# Patient Record
Sex: FEMALE | Race: WHITE | NOT HISPANIC OR LATINO | Employment: FULL TIME | URBAN - METROPOLITAN AREA
[De-identification: names, ages, dates, MRNs, and addresses within clinical notes are randomized per-mention and may not be internally consistent; named-entity substitution may affect disease eponyms.]

---

## 2019-03-28 ENCOUNTER — TRANSCRIBE ORDERS (OUTPATIENT)
Dept: ADMINISTRATIVE | Facility: HOSPITAL | Age: 39
End: 2019-03-28

## 2019-03-28 DIAGNOSIS — G93.2 BENIGN INTRACRANIAL HYPERTENSION: Primary | ICD-10-CM

## 2019-04-03 ENCOUNTER — HOSPITAL ENCOUNTER (OUTPATIENT)
Dept: MRI IMAGING | Facility: HOSPITAL | Age: 39
Discharge: HOME | End: 2019-04-03
Admitting: FAMILY MEDICINE

## 2019-04-03 DIAGNOSIS — G93.2 BENIGN INTRACRANIAL HYPERTENSION: ICD-10-CM

## 2019-04-03 PROCEDURE — 0 GADOBENATE DIMEGLUMINE 529 MG/ML SOLUTION: Performed by: FAMILY MEDICINE

## 2019-04-03 PROCEDURE — 82565 ASSAY OF CREATININE: CPT

## 2019-04-03 PROCEDURE — A9577 INJ MULTIHANCE: HCPCS | Performed by: FAMILY MEDICINE

## 2019-04-03 PROCEDURE — 70553 MRI BRAIN STEM W/O & W/DYE: CPT

## 2019-04-03 RX ADMIN — GADOBENATE DIMEGLUMINE 19 ML: 529 INJECTION, SOLUTION INTRAVENOUS at 14:37

## 2019-04-04 ENCOUNTER — APPOINTMENT (OUTPATIENT)
Dept: MRI IMAGING | Facility: HOSPITAL | Age: 39
End: 2019-04-04

## 2019-04-10 LAB — CREAT BLDA-MCNC: 0.7 MG/DL (ref 0.6–1.3)

## 2019-05-16 ENCOUNTER — OFFICE VISIT (OUTPATIENT)
Dept: NEUROSURGERY | Facility: CLINIC | Age: 39
End: 2019-05-16

## 2019-05-16 VITALS
BODY MASS INDEX: 36.32 KG/M2 | HEART RATE: 88 BPM | SYSTOLIC BLOOD PRESSURE: 146 MMHG | WEIGHT: 205 LBS | HEIGHT: 63 IN | RESPIRATION RATE: 20 BRPM | DIASTOLIC BLOOD PRESSURE: 112 MMHG

## 2019-05-16 DIAGNOSIS — G93.2 PSEUDOTUMOR CEREBRI: Primary | ICD-10-CM

## 2019-05-16 DIAGNOSIS — H53.8 BLURRED VISION: ICD-10-CM

## 2019-05-16 PROCEDURE — 99214 OFFICE O/P EST MOD 30 MIN: CPT | Performed by: NEUROLOGICAL SURGERY

## 2019-05-16 NOTE — PROGRESS NOTES
Subjective   Patient ID: Marie Bernabe is a 39 y.o. female is here today for follow-up of HA, Blurred vison/Psuedotumor.  Pt is unaccompanied.    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    She had essentially resolution of her symptomatology and reduction and intracranial pressure spontaneously.    The last 3 to 4 months she has noted increasing problems with vision in the right eye.  Additionally she has had a sense of pressure on the right side of her head.  On this basis she was evaluated in a optometry office in [place] and the suggestion of multiple sclerosis was made an MRI scan was ordered.    She is return to the office for further perhaps more definitive treatment.    The following portions of the patient's history were reviewed and updated as appropriate: allergies, current medications, past family history, past medical history, past social history, past surgical history and problem list.    Review of Systems   Eyes: Positive for visual disturbance (blurred vision).   Genitourinary: Negative for difficulty urinating and enuresis.   Neurological: Positive for dizziness (occasionally) and headaches. Negative for tremors, syncope, facial asymmetry, speech difficulty, weakness and numbness.   Psychiatric/Behavioral: Negative for confusion and decreased concentration.     Past Medical History:   Diagnosis Date   • Anxiety    • Blurred vision    • Depression    • Pseudotumor cerebri    • Spinal headache     AFTER SPINAL TAP.       Past Surgical History:   Procedure Laterality Date   • EMBOLIZATION CEREBRAL Left 3/14/2016    Procedure: CEREBRAL ANGIOGRAM W/ LT LUMBAR PUNCTURE;  Surgeon: Maico Groves MD;  Location: [facility] 18/19;  Service:    • EMBOLIZATION CEREBRAL Right 5/25/2016    Procedure:  CEREBRAL ANGIOGRAM AND VENOGRAM ;  Surgeon: Maico Groves MD;  Location: [facility] OR 18/19;  Service:    • LUMBAR PUNCTURE  03/14/2016    Dr. 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   Tobacco Use   • Smoking status: Never Smoker   • Smokeless tobacco: Never Used   Substance and Sexual Activity   • Alcohol use: No   • Drug use: No   • Sexual activity: Defer       Family History   Problem Relation Age of Onset   • Cancer Maternal Grandmother         breast/passed   • Heart disease Maternal Grandfather    • Heart disease Paternal Grandfather         No Known Allergies      Current Outpatient Medications:   •  naproxen sodium (ALEVE) 220 MG tablet, Take 220 mg by mouth as needed., Disp: , Rfl:     Objective   Physical Exam   Constitutional: She is oriented to person, place, and time.   Eyes: EOM are normal.   Fundoscopic exam:       The right eye shows papilledema.        The left eye shows papilledema.   There is evidence of prior swelling of the optic nerves.  I do not see any hemorrhages or exudates.   Neck: Carotid bruit is not present.   Cardiovascular: Normal rate, regular rhythm and normal heart sounds.   Neurological: She is oriented to person, place, and time. She has a normal Finger-Nose-Finger Test, a normal Heel to Shin Test, a normal Romberg Test and a normal Tandem Gait Test. Gait normal.   Reflex Scores:       Tricep reflexes are 2+ on the right side and 2+ on the left side.       Bicep reflexes are 2+ on the right side and 2+ on the left side.       Brachioradialis reflexes are 2+ on the right side and 2+ on the left side.       Patellar reflexes are 2+ on the right side and 2+ on the left  side.       Achilles reflexes are 2+ on the right side and 2+ on the left side.  Psychiatric: Her speech is normal.     Neurologic Exam     Mental Status   Oriented to person, place, and time.   Registration: recalls 3 of 3 objects. Recall at 5 minutes: recalls 3 of 3 objects. Follows 3 step commands.   Attention: normal. Concentration: normal.   Speech: speech is normal   Level of consciousness: alert  Knowledge: good.   Able to name object. Able to read. Able to repeat. Able to write. Normal comprehension.     Cranial Nerves     CN II   Visual fields full to confrontation.     CN III, IV, VI   Extraocular motions are normal.   Right pupil: Consensual response: intact.   Left pupil: Consensual response: intact.   CN III: no CN III palsy  CN VI: no CN VI palsy  Nystagmus: none   Diplopia: none  Ophthalmoparesis: none  Upgaze: normal  Downgaze: normal  Conjugate gaze: present  Vestibulo-ocular reflex: present    CN V   Facial sensation intact.     CN VII   Facial expression full, symmetric.     CN VIII   CN VIII normal.     CN IX, X   CN IX normal.     CN XI   CN XI normal.     CN XII   CN XII normal.     Motor Exam   Muscle bulk: normal  Overall muscle tone: normal  Right arm tone: normal  Left arm tone: normal  Right arm pronator drift: absent  Left arm pronator drift: absent  Right leg tone: normal  Left leg tone: normal    Strength   Right neck flexion: 5/5  Left neck flexion: 5/5  Right neck extension: 5/5  Left neck extension: 5/5  Right deltoid: 5/5  Left deltoid: 5/5  Right biceps: 5/5  Left biceps: 5/5  Right triceps: 5/5  Left triceps: 5/5  Right wrist flexion: 5/5  Left wrist flexion: 5/5  Right wrist extension: 5/5  Left wrist extension: 5/5  Right interossei: 5/5  Left interossei: 5/5  Right abdominals: 5/5  Left abdominals: 5/5  Right iliopsoas: 5/5  Left iliopsoas: 5/5  Right quadriceps: 5/5  Left quadriceps: 5/5  Right hamstrin/5  Left hamstrin/5  Right glutei: 5/5  Left glutei: 5/5  Right  anterior tibial: 5/5  Left anterior tibial: 5/5  Right posterior tibial: 5/5  Left posterior tibial: 5/5  Right peroneal: 5/5  Left peroneal: 5/5  Right gastroc: 5/5  Left gastroc: 5/5    Sensory Exam   Light touch normal.   Vibration normal.   Proprioception normal.   Pinprick normal.     Gait, Coordination, and Reflexes     Gait  Gait: normal    Coordination   Romberg: negative  Finger to nose coordination: normal  Heel to shin coordination: normal  Tandem walking coordination: normal    Tremor   Resting tremor: absent  Intention tremor: absent  Action tremor: absent    Reflexes   Right brachioradialis: 2+  Left brachioradialis: 2+  Right biceps: 2+  Left biceps: 2+  Right triceps: 2+  Left triceps: 2+  Right patellar: 2+  Left patellar: 2+  Right achilles: 2+  Left achilles: 2+  Right : 2+  Left : 2+  Right plantar: normal  Left plantar: normal  Right Frias: absent  Left Frias: absent  Right ankle clonus: absent  Left ankle clonus: absent      Assessment/Plan   Independent Review of Radiographic Studies:    I personally reviewed the MRI scan of brain which appears to be normal.  There is evidence of an empty sella.  Medical Decision Making:    This very pleasant patient returns to the office today with recurrent symptomatology.    I am going to get her to see an ophthalmologist for better evaluation of her life situation.    I also explained to the patient that we have newer stents available and that if she continues to have symptomatology it is reasonable to consider trying again to reopen the narrowed area in the drainage  system of the blood out of her brain.    I am going to recommend that she undergo lumbar puncture again in order to measure pressure to see whether or not the pressure remains elevated.  This was the first step in determining whether any other invasive testing is necessary in order to try to treat this.      Marie was seen today for ha, blurred vison/psuedotumor.    Diagnoses  and all orders for this visit:    Pseudotumor cerebri  -     Ambulatory Referral to [facility] for Minor Surgical Procedure  -     Ambulatory Referral to Ophthalmology    Blurred vision  -     Ambulatory Referral to Ophthalmology    Other orders  -     Obtain Informed Consent; Standing  -     Notify Provider if Patient Taking Blood Thinning Agents; Standing  -     Glucose, CSF - Cerebrospinal Fluid, Lumbar Puncture; Standing  -     Protein, CSF - Cerebrospinal Fluid, Lumbar Puncture; Standing  -     Culture, CSF - Cerebrospinal Fluid, Lumbar Puncture; Standing  -     Gram Stain; Standing  -     Cell Count With Differential, CSF; Standing  -     Cell Count With Differential, CSF; Standing  -     Head of Bed Flat; Standing      Return for Follow up after testing.

## 2019-05-30 ENCOUNTER — HOSPITAL ENCOUNTER (OUTPATIENT)
Dept: INFUSION THERAPY | Facility: HOSPITAL | Age: 39
Discharge: HOME | End: 2019-05-30
Admitting: NEUROLOGICAL SURGERY

## 2019-05-30 VITALS
DIASTOLIC BLOOD PRESSURE: 84 MMHG | HEART RATE: 80 BPM | SYSTOLIC BLOOD PRESSURE: 142 MMHG | TEMPERATURE: 98 F | RESPIRATION RATE: 16 BRPM | OXYGEN SATURATION: 96 %

## 2019-05-30 DIAGNOSIS — G93.2 PSEUDOTUMOR CEREBRI: Primary | ICD-10-CM

## 2019-05-30 LAB
APPEARANCE CSF: CLEAR
COLOR CSF: COLORLESS
GLUCOSE CSF-MCNC: 59 MG/DL (ref 40–70)
METHOD: ABNORMAL
NUC CELL # CSF MANUAL: 1 /MM3 (ref 0–5)
PROT CSF-MCNC: 30 MG/DL (ref 15–45)
RBC # CSF MANUAL: 3 /MM3 (ref 0–0)
TUBE # CSF: 4

## 2019-05-30 PROCEDURE — 87015 SPECIMEN INFECT AGNT CONCNTJ: CPT | Performed by: NEUROLOGICAL SURGERY

## 2019-05-30 PROCEDURE — 87070 CULTURE OTHR SPECIMN AEROBIC: CPT | Performed by: NEUROLOGICAL SURGERY

## 2019-05-30 PROCEDURE — 84157 ASSAY OF PROTEIN OTHER: CPT | Performed by: NEUROLOGICAL SURGERY

## 2019-05-30 PROCEDURE — 62272 THER SPI PNXR DRG CSF: CPT | Performed by: NEUROLOGICAL SURGERY

## 2019-05-30 PROCEDURE — 87205 SMEAR GRAM STAIN: CPT | Performed by: NEUROLOGICAL SURGERY

## 2019-05-30 PROCEDURE — 82945 GLUCOSE OTHER FLUID: CPT | Performed by: NEUROLOGICAL SURGERY

## 2019-05-30 PROCEDURE — 89050 BODY FLUID CELL COUNT: CPT | Performed by: NEUROLOGICAL SURGERY

## 2019-05-30 RX ORDER — LIDOCAINE HYDROCHLORIDE 10 MG/ML
20 INJECTION, SOLUTION INFILTRATION; PERINEURAL ONCE
Status: COMPLETED | OUTPATIENT
Start: 2019-05-30 | End: 2019-05-30

## 2019-05-30 RX ADMIN — LIDOCAINE HYDROCHLORIDE 20 ML: 10 INJECTION, SOLUTION INFILTRATION; PERINEURAL at 11:34

## 2019-06-02 LAB
BACTERIA SPEC AEROBE CULT: NORMAL
GRAM STN SPEC: NORMAL
GRAM STN SPEC: NORMAL

## 2019-06-11 ENCOUNTER — OFFICE VISIT (OUTPATIENT)
Dept: NEUROSURGERY | Facility: CLINIC | Age: 39
End: 2019-06-11

## 2019-06-11 VITALS
HEART RATE: 119 BPM | RESPIRATION RATE: 18 BRPM | BODY MASS INDEX: 36.14 KG/M2 | WEIGHT: 204 LBS | SYSTOLIC BLOOD PRESSURE: 122 MMHG | HEIGHT: 63 IN | DIASTOLIC BLOOD PRESSURE: 88 MMHG

## 2019-06-11 DIAGNOSIS — G08 TRANSVERSE SINUS THROMBOSIS: Primary | ICD-10-CM

## 2019-06-11 DIAGNOSIS — G93.2 PSEUDOTUMOR CEREBRI: ICD-10-CM

## 2019-06-11 PROCEDURE — 99214 OFFICE O/P EST MOD 30 MIN: CPT | Performed by: NEUROLOGICAL SURGERY

## 2019-06-11 RX ORDER — ASPIRIN 81 MG/1
81 TABLET, CHEWABLE ORAL ONCE
Status: DISCONTINUED | OUTPATIENT
Start: 2019-06-11 | End: 2019-07-03

## 2019-06-11 RX ORDER — CLOPIDOGREL BISULFATE 75 MG/1
75 TABLET ORAL DAILY
Qty: 30 TABLET | Refills: 6 | Status: ON HOLD | OUTPATIENT
Start: 2019-06-11 | End: 2019-07-11 | Stop reason: SDUPTHER

## 2019-06-11 RX ORDER — HYDROCHLOROTHIAZIDE 25 MG/1
25 TABLET ORAL DAILY
Refills: 5 | COMMUNITY
Start: 2019-06-04

## 2019-06-11 RX ORDER — SODIUM CHLORIDE 9 MG/ML
100 INJECTION, SOLUTION INTRAVENOUS CONTINUOUS
Status: CANCELLED | OUTPATIENT
Start: 2019-07-10

## 2019-06-11 NOTE — PROGRESS NOTES
"Subjective   Patient ID: Marie Bernabe is a 39 y.o. female is here today for follow-up pseudotumor cerebri. Patient presents unaccompanied.     History of Present Illness    She returns the office today for ongoing follow-up with history of pseudotumor cerebri status post lumbar puncture on May 30 with Dr. Groves.  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  She had prior history of blurred vision with reduction of intracranial pressure that resolved spontaneously a few years ago.    Over the last few months, she began developing increasing problems with vision in the right eye with a sense of pressure in the right side of her head.  Today, she reports that the symptoms continue.  She feels that there is a \"black area\" on the right side of her right eye visual field.  She denies any double or blurred vision.  She states that her vision is clear enough with the right eye even with the left eye closed to drive without issue.  She continues to have pressure headaches that present in the back of her neck on the right side and behind her ear.  She thinks a lot of it has to do with allergy and sinus problems.    Opening LP pressure 48.    She presents unaccompanied.      /88 (BP Location: Right arm, Patient Position: Sitting)   Pulse 119   Resp 18   Ht 160 cm (63\")   Wt 92.5 kg (204 lb)   BMI 36.14 kg/m²     The following portions of the patient's history were reviewed and updated as appropriate: allergies, current medications, past family history, past medical history, past social history, past surgical history and problem list.    Review of Systems   HENT: Negative for trouble swallowing.    Eyes: Positive for visual disturbance (occ'l blurry).   Musculoskeletal: Negative for gait problem.   Neurological: Positive for headaches (pressure). Negative for dizziness, tremors, seizures, syncope, speech difficulty, " weakness, light-headedness and numbness.   Psychiatric/Behavioral: Negative for confusion and decreased concentration.       Objective   Physical Exam   Constitutional: She is oriented to person, place, and time. She appears well-developed and well-nourished. She is cooperative.  Non-toxic appearance. She does not have a sickly appearance. She does not appear ill.   Pleasant well-appearing obese younger female   HENT:   Head: Normocephalic and atraumatic.   Nontender firm palpation bilateral frontal sinuses.  Tender to minimal palpation bilateral maxillary sinuses.   Eyes: EOM are normal. Pupils are equal, round, and reactive to light.   Neck: Neck supple. No tracheal deviation present.   Pulmonary/Chest: Effort normal.   Musculoskeletal: Normal range of motion. She exhibits no tenderness or deformity.   Moving all extremities well, strength equal and intact throughout   Neurological: She is alert and oriented to person, place, and time. She has normal strength. She displays no tremor. No cranial nerve deficit. Coordination and gait normal. GCS eye subscore is 4. GCS verbal subscore is 5. GCS motor subscore is 6.   Gait is stable and upright  Extraocular movements intact  Normal facial symmetry  Visual fields full and intact bilaterally   Skin: Skin is warm and dry.   Psychiatric: She has a normal mood and affect. Her behavior is normal. Thought content normal.   Vitals reviewed.    Neurologic Exam     Mental Status   Oriented to person, place, and time.     Cranial Nerves     CN III, IV, VI   Pupils are equal, round, and reactive to light.  Extraocular motions are normal.     Motor Exam     Strength   Strength 5/5 throughout.       Assessment/Plan   Independent Review of Radiographic Studies:    CSF studies dated May 30, 2019 reveal clear colorless fluid.  4th tube had only 3 RBCs.  Total nucleated cells 1.  Glucose 59.  Protein 30.  Opening pressure= 48 cm of water    Medical Decision Making:    I confirmed and  obtained the above history as recorded by the nurse practitioner acting as a scribe. I performed the above examination and it is documented by the nurse practitioner acting as a scribe.    The patient presents at this time with a history as noted above.  She did have a few days of improved headache and then it came back-with a vengeance.    She does have very elevated intracranial pressure.  She does also have a transverse sinus narrowed area that, we demonstrated caused increased pressure intracranially by measuring proximal and distal to the area of stenosis of the intracranial pressure.    Treatment options include an attempt to reopen the transverse sinus with a stent-again.  Previously I was unable to pass a relatively inflexible stent into this area.  I believe that the more flexible stents are now available and we can probably get a stent into their to try to alleviate this particular problem.  Other options include placement of a lumboperitoneal shunt to reduce the pressure.  I did explain to the patient unfortunately this does not attack the primary cause of the problem and shunts like this are prone to failure.    I am to recommend that we try, again, to reopen the stenotic segment of the transverse sinus.    I explained the risk benefits and alternatives of transverse sinus venography and stenting.  Risks include but are not limited to the possibility of death, infection, bleeding, damage to the vein, damage to arteries leading to the brain, lack of ability to perform the procedure, thrombosis of the stent causing increased problems etc.  The alternative is a lumboperitoneal shunt and/or abstinence of treatment.  Patient voiced understanding and request that we proceed.    After a complete physical exam, the patient has been informed of the consequences, benefits, appropriate us, and office policies regarding the medication being prescribed. A REY check will be made on-line, and will be repeated if  prescription is renewed after a 90 day period. The patient agrees to adhering to the medication regimen as prescribed.    The patient has been advised that we will manage post-operative pain for 1 month. If further narcotic medication is needed beyond that period, a referral back to the primary care physician or to a pain management specialist will be made. If the patient cancels or fails to show for scheduled follow-up visits or the pain management referral,  further narcotic prescriptions from this practice may cease.    Plan: Cerebral arteriography and venography with placement of a transverse sinus stent and angioplasty.    Marie was seen today for pseudotumor cerebri.    Diagnoses and all orders for this visit:    Left transverse sinus hypoplasia and right transverse sinus stenosis  -     Case Request; Standing  -     CBC and Differential; Future  -     Basic metabolic panel; Future  -     sodium chloride 0.9 % infusion  -     P2Y12 Platelet Inhibition; Future  -     Platelet Response Aspirin; Future  -     Case Request  -     aspirin chewable tablet 81 mg    Pseudotumor cerebri  -     Case Request; Standing  -     CBC and Differential; Future  -     Basic metabolic panel; Future  -     sodium chloride 0.9 % infusion  -     P2Y12 Platelet Inhibition; Future  -     Platelet Response Aspirin; Future  -     Case Request  -     aspirin chewable tablet 81 mg    Other orders  -     Follow anesthesia standing orders.  -     Obtain informed consent  -     Provide NPO Instructions to Patient; Future  -     Clorhexidine skin prep; Future  -     Follow Anesthesia Guidelines / Standing Orders; Standing  -     Obtain informed consent; Standing  -     SCD (sequential compression device)- to be placed on patient in Pre-op; Standing  -     Clip operative site; Standing  -     Inpatient Admission; Standing  -     clopidogrel (PLAVIX) 75 MG tablet; Take 1 tablet by mouth Daily.      Return for Recheck 2 weeks after surgery,  Follow up with nurse practitioner.

## 2019-06-16 ENCOUNTER — RESULTS ENCOUNTER (OUTPATIENT)
Dept: NEUROSURGERY | Facility: CLINIC | Age: 39
End: 2019-06-16

## 2019-06-16 DIAGNOSIS — G93.2 PSEUDOTUMOR CEREBRI: ICD-10-CM

## 2019-06-16 DIAGNOSIS — G08 TRANSVERSE SINUS THROMBOSIS: ICD-10-CM

## 2019-06-25 ENCOUNTER — TELEPHONE (OUTPATIENT)
Dept: NEUROSURGERY | Facility: CLINIC | Age: 39
End: 2019-06-25

## 2019-07-03 ENCOUNTER — APPOINTMENT (OUTPATIENT)
Dept: PREADMISSION TESTING | Facility: HOSPITAL | Age: 39
End: 2019-07-03

## 2019-07-03 VITALS
BODY MASS INDEX: 35.97 KG/M2 | SYSTOLIC BLOOD PRESSURE: 139 MMHG | RESPIRATION RATE: 20 BRPM | WEIGHT: 203 LBS | OXYGEN SATURATION: 97 % | TEMPERATURE: 98 F | DIASTOLIC BLOOD PRESSURE: 92 MMHG | HEIGHT: 63 IN | HEART RATE: 108 BPM

## 2019-07-03 DIAGNOSIS — G93.2 PSEUDOTUMOR CEREBRI: ICD-10-CM

## 2019-07-03 DIAGNOSIS — G08 TRANSVERSE SINUS THROMBOSIS: ICD-10-CM

## 2019-07-03 LAB
ANION GAP SERPL CALCULATED.3IONS-SCNC: 11.8 MMOL/L (ref 5–15)
ASA PLATELET INHIBITION: 538 ARU
BASOPHILS # BLD AUTO: 0.01 10*3/MM3 (ref 0–0.2)
BASOPHILS NFR BLD AUTO: 0.2 % (ref 0–1.5)
BUN BLD-MCNC: 9 MG/DL (ref 6–20)
BUN/CREAT SERPL: 10.8 (ref 7–25)
CALCIUM SPEC-SCNC: 9.5 MG/DL (ref 8.6–10.5)
CHLORIDE SERPL-SCNC: 95 MMOL/L (ref 98–107)
CO2 SERPL-SCNC: 31.2 MMOL/L (ref 22–29)
CREAT BLD-MCNC: 0.83 MG/DL (ref 0.57–1)
DEPRECATED RDW RBC AUTO: 38.1 FL (ref 37–54)
EOSINOPHIL # BLD AUTO: 0.04 10*3/MM3 (ref 0–0.4)
EOSINOPHIL NFR BLD AUTO: 1 % (ref 0.3–6.2)
ERYTHROCYTE [DISTWIDTH] IN BLOOD BY AUTOMATED COUNT: 11.9 % (ref 12.3–15.4)
GFR SERPL CREATININE-BSD FRML MDRD: 77 ML/MIN/1.73
GLUCOSE BLD-MCNC: 186 MG/DL (ref 65–99)
HCT VFR BLD AUTO: 39.3 % (ref 34–46.6)
HGB BLD-MCNC: 13 G/DL (ref 12–15.9)
IMM GRANULOCYTES # BLD AUTO: 0.01 10*3/MM3 (ref 0–0.05)
IMM GRANULOCYTES NFR BLD AUTO: 0.2 % (ref 0–0.5)
LYMPHOCYTES # BLD AUTO: 1.2 10*3/MM3 (ref 0.7–3.1)
LYMPHOCYTES NFR BLD AUTO: 29.1 % (ref 19.6–45.3)
MCH RBC QN AUTO: 29.5 PG (ref 26.6–33)
MCHC RBC AUTO-ENTMCNC: 33.1 G/DL (ref 31.5–35.7)
MCV RBC AUTO: 89.1 FL (ref 79–97)
MONOCYTES # BLD AUTO: 0.3 10*3/MM3 (ref 0.1–0.9)
MONOCYTES NFR BLD AUTO: 7.3 % (ref 5–12)
NEUTROPHILS # BLD AUTO: 2.57 10*3/MM3 (ref 1.7–7)
NEUTROPHILS NFR BLD AUTO: 62.2 % (ref 42.7–76)
NRBC BLD AUTO-RTO: 0 /100 WBC (ref 0–0.2)
PA ADP PRP-ACNC: 201 PRU (ref 194–418)
PLATELET # BLD AUTO: 218 10*3/MM3 (ref 140–450)
PMV BLD AUTO: 10.5 FL (ref 6–12)
POTASSIUM BLD-SCNC: 3.1 MMOL/L (ref 3.5–5.2)
RBC # BLD AUTO: 4.41 10*6/MM3 (ref 3.77–5.28)
SODIUM BLD-SCNC: 138 MMOL/L (ref 136–145)
WBC NRBC COR # BLD: 4.13 10*3/MM3 (ref 3.4–10.8)

## 2019-07-03 PROCEDURE — 85025 COMPLETE CBC W/AUTO DIFF WBC: CPT | Performed by: NEUROLOGICAL SURGERY

## 2019-07-03 PROCEDURE — 85576 BLOOD PLATELET AGGREGATION: CPT | Performed by: NEUROLOGICAL SURGERY

## 2019-07-03 PROCEDURE — 93010 ELECTROCARDIOGRAM REPORT: CPT | Performed by: INTERNAL MEDICINE

## 2019-07-03 PROCEDURE — 80048 BASIC METABOLIC PNL TOTAL CA: CPT | Performed by: NEUROLOGICAL SURGERY

## 2019-07-03 PROCEDURE — 93005 ELECTROCARDIOGRAM TRACING: CPT

## 2019-07-03 PROCEDURE — 36415 COLL VENOUS BLD VENIPUNCTURE: CPT

## 2019-07-03 RX ORDER — ASPIRIN 81 MG/1
81 TABLET ORAL DAILY
COMMUNITY

## 2019-07-03 NOTE — DISCHARGE INSTRUCTIONS
2% CHLORAHEXIDINE GLUCONATE* CLOTH  Preparing or “prepping” skin before surgery can reduce the risk of infection at the surgical site. To make the process easier, [facility] has chosen disposable cloths moistened with a rinse-free, 2% Chlorhexidine Gluconate (CHG) antiseptic solution. The steps below outline the prepping process and should be carefully followed.        Use the prep cloth on the area that is circled in the diagram             Directions Night before Surgery  1) Shower using a fresh bar of anti-bacterial soap (such as Dial) and clean washcloth.  Use a clean towel to completely dry your skin.  2) Do not use any lotions, oils or creams on your skin.  3) Open the package and remove 1 cloth, wipe your skin for 30 seconds in a circular motion.  Allow to dry for 3 minutes.  4) Repeat #3 with second cloth.  5) Do not touch your eyes, ears, or mouth with the prep cloth.  6) Allow the wet prep solution to air dry.  7) Discard the prep cloth and wash your hands with soap and water.   8) Dress in clean bed clothes and sleep on fresh clean bed sheets.   9) You may experience some temporary itching after the prep.    Directions Day of Surgery  1) Repeat steps 1,2,3,4,5,6,7, and 9.   2) Dress in clean clothes before coming to the hospital.  Take the following medications the morning of surgery with a small sip of water:    Pt to call Dr Groves for specific meds to take day of surgery    General Instructions:  • Do not eat or drink anything after midnight the night before surgery.  • Infants may have breast milk up to four hours before surgery.  • Infants drinking formula may drink formula up to six hours before surgery.   • Patients who avoid smoking, chewing tobacco and alcohol for 4 weeks prior to surgery have a reduced risk of post-operative complications.  Quit smoking as many days before surgery as you can.  • Do not smoke, use chewing tobacco or drink alcohol the day of surgery.   • If  applicable bring your C-PAP/ BI-PAP machine.  • Bring any papers given to you in the doctor’s office.  • Wear clean comfortable clothes and socks.  • Do not wear contact lenses, false eyelashes or make-up.  Bring a case for your glasses.   • Bring crutches or walker if applicable.  • Remove all piercings.  Leave jewelry and any other valuables at home.  • Hair extensions with metal clips must be removed prior to surgery.  • The Pre-Admission Testing nurse will instruct you to bring medications if unable to obtain an accurate list in Pre-Admission Testing.        If you were given a blood bank ID arm band remember to bring it with you the day of surgery.    Preventing a Surgical Site Infection:  • For 2 to 3 days before surgery, avoid shaving with a razor because the razor can irritate skin and make it easier to develop an infection.    • Any areas of open skin can increase the risk of a post-operative wound infection by allowing bacteria to enter and travel throughout the body.  Notify your surgeon if you have any skin wounds / rashes even if it is not near the expected surgical site.  The area will need assessed to determine if surgery should be delayed until it is healed.  • The night prior to surgery sleep in a clean bed with clean clothing.  Do not allow pets to sleep with you.  • Shower on the morning of surgery using a fresh bar of anti-bacterial soap (such as Dial) and clean washcloth.  Dry with a clean towel and dress in clean clothing.  • Ask your surgeon if you will be receiving antibiotics prior to surgery.  • Make sure you, your family, and all healthcare providers clean their hands with soap and water or an alcohol based hand  before caring for you or your wound.    Day of surgery:7/10/19   0730  Upon arrival, a Pre-op nurse and Anesthesiologist will review your health history, obtain vital signs, and answer questions you may have.  The only belongings needed at this time will be your home  medications and if applicable your C-PAP/BI-PAP machine.  If you are staying overnight your family can leave the rest of your belongings in the car and bring them to your room later.  A Pre-op nurse will start an IV and you may receive medication in preparation for surgery, including something to help you relax.  Your family will be able to see you in the Pre-op area.  While you are in surgery your family should notify the waiting room  if they leave the waiting room area and provide a contact phone number.    Please be aware that surgery does come with discomfort.  We want to make every effort to control your discomfort so please discuss any uncontrolled symptoms with your nurse.   Your doctor will most likely have prescribed pain medications.      If you are going home after surgery you will receive individualized written care instructions before being discharged.  A responsible adult must drive you to and from the hospital on the day of your surgery and stay with you for 24 hours.    If you are staying overnight following surgery, you will be transported to your hospital room following the recovery period.  [facility] has all private rooms.    You have received a list of surgical assistants for your reference.  If you have any questions please call Pre-Admission Testing at [phone].  Deductibles and co-payments are collected on the day of service. Please be prepared to pay the required co-pay, deductible or deposit on the day of service as defined by your plan.

## 2019-07-05 DIAGNOSIS — G08 TRANSVERSE SINUS THROMBOSIS: Primary | ICD-10-CM

## 2019-07-08 ENCOUNTER — PREP FOR SURGERY (OUTPATIENT)
Dept: OTHER | Facility: HOSPITAL | Age: 39
End: 2019-07-08

## 2019-07-09 ENCOUNTER — ANESTHESIA EVENT (OUTPATIENT)
Dept: PERIOP | Facility: HOSPITAL | Age: 39
End: 2019-07-09

## 2019-07-10 ENCOUNTER — HOSPITAL ENCOUNTER (INPATIENT)
Facility: HOSPITAL | Age: 39
LOS: 1 days | Discharge: HOME | End: 2019-07-11
Attending: NEUROLOGICAL SURGERY | Admitting: NEUROLOGICAL SURGERY

## 2019-07-10 ENCOUNTER — APPOINTMENT (OUTPATIENT)
Dept: GENERAL RADIOLOGY | Facility: HOSPITAL | Age: 39
End: 2019-07-10

## 2019-07-10 ENCOUNTER — ANESTHESIA (OUTPATIENT)
Dept: PERIOP | Facility: HOSPITAL | Age: 39
End: 2019-07-10

## 2019-07-10 DIAGNOSIS — G93.2 PSEUDOTUMOR CEREBRI: ICD-10-CM

## 2019-07-10 DIAGNOSIS — G08 TRANSVERSE SINUS THROMBOSIS: ICD-10-CM

## 2019-07-10 LAB
B-HCG UR QL: NEGATIVE
GLUCOSE BLDC GLUCOMTR-MCNC: 121 MG/DL (ref 70–130)
GLUCOSE BLDC GLUCOMTR-MCNC: 143 MG/DL (ref 70–130)
INTERNAL NEGATIVE CONTROL: NEGATIVE
INTERNAL POSITIVE CONTROL: POSITIVE
Lab: NORMAL
PA ADP PRP-ACNC: 170 PRU (ref 194–418)
PA ADP PRP-ACNC: 208 PRU (ref 194–418)

## 2019-07-10 PROCEDURE — C1894 INTRO/SHEATH, NON-LASER: HCPCS | Performed by: NEUROLOGICAL SURGERY

## 2019-07-10 PROCEDURE — 25010000002 DEXAMETHASONE PER 1 MG: Performed by: ANESTHESIOLOGY

## 2019-07-10 PROCEDURE — C1769 GUIDE WIRE: HCPCS | Performed by: NEUROLOGICAL SURGERY

## 2019-07-10 PROCEDURE — C1887 CATHETER, GUIDING: HCPCS | Performed by: NEUROLOGICAL SURGERY

## 2019-07-10 PROCEDURE — 85576 BLOOD PLATELET AGGREGATION: CPT | Performed by: NEUROLOGICAL SURGERY

## 2019-07-10 PROCEDURE — 25010000002 PROPOFOL 10 MG/ML EMULSION: Performed by: ANESTHESIOLOGY

## 2019-07-10 PROCEDURE — 85347 COAGULATION TIME ACTIVATED: CPT

## 2019-07-10 PROCEDURE — 36012 PLACE CATHETER IN VEIN: CPT | Performed by: NEUROLOGICAL SURGERY

## 2019-07-10 PROCEDURE — 057L3DZ DILATION OF INTRACRANIAL VEIN WITH INTRALUMINAL DEVICE, PERCUTANEOUS APPROACH: ICD-10-PCS | Performed by: NEUROLOGICAL SURGERY

## 2019-07-10 PROCEDURE — 82803 BLOOD GASES ANY COMBINATION: CPT

## 2019-07-10 PROCEDURE — B512YZA FLUOROSCOPY OF INTRACRANIAL SINUSES USING OTHER CONTRAST, GUIDANCE: ICD-10-PCS | Performed by: NEUROLOGICAL SURGERY

## 2019-07-10 PROCEDURE — 25810000003 SODIUM CHLORIDE 0.9 % WITH KCL 20 MEQ 20-0.9 MEQ/L-% SOLUTION: Performed by: NEUROLOGICAL SURGERY

## 2019-07-10 PROCEDURE — 36224 PLACE CATH CAROTD ART: CPT | Performed by: NEUROLOGICAL SURGERY

## 2019-07-10 PROCEDURE — C1760 CLOSURE DEV, VASC: HCPCS | Performed by: NEUROLOGICAL SURGERY

## 2019-07-10 PROCEDURE — 25010000002 PROMETHAZINE PER 50 MG: Performed by: PHYSICIAN ASSISTANT

## 2019-07-10 PROCEDURE — 25010000002 HEPARIN (PORCINE) PER 1000 UNITS: Performed by: ANESTHESIOLOGY

## 2019-07-10 PROCEDURE — 81025 URINE PREGNANCY TEST: CPT | Performed by: NEUROLOGICAL SURGERY

## 2019-07-10 PROCEDURE — 37238 OPEN/PERQ PLACE STENT SAME: CPT | Performed by: NEUROLOGICAL SURGERY

## 2019-07-10 PROCEDURE — 25010000002 ONDANSETRON PER 1 MG: Performed by: INTERNAL MEDICINE

## 2019-07-10 PROCEDURE — 25010000002 FENTANYL CITRATE (PF) 100 MCG/2ML SOLUTION: Performed by: ANESTHESIOLOGY

## 2019-07-10 PROCEDURE — 0 IODIXANOL PER 1 ML: Performed by: NEUROLOGICAL SURGERY

## 2019-07-10 PROCEDURE — 25010000002 HEPARIN (PORCINE) PER 1000 UNITS: Performed by: NEUROLOGICAL SURGERY

## 2019-07-10 PROCEDURE — 75870 VEIN X-RAY SKULL: CPT | Performed by: NEUROLOGICAL SURGERY

## 2019-07-10 PROCEDURE — 75870 VEIN X-RAY SKULL: CPT

## 2019-07-10 PROCEDURE — 85018 HEMOGLOBIN: CPT

## 2019-07-10 PROCEDURE — 85014 HEMATOCRIT: CPT

## 2019-07-10 PROCEDURE — 25010000002 ONDANSETRON PER 1 MG: Performed by: ANESTHESIOLOGY

## 2019-07-10 PROCEDURE — C1876 STENT, NON-COA/NON-COV W/DEL: HCPCS | Performed by: NEUROLOGICAL SURGERY

## 2019-07-10 PROCEDURE — 85576 BLOOD PLATELET AGGREGATION: CPT | Performed by: NURSE PRACTITIONER

## 2019-07-10 PROCEDURE — 25010000003 POTASSIUM CHLORIDE PER 2 MEQ: Performed by: ANESTHESIOLOGY

## 2019-07-10 PROCEDURE — 4A043B0 MEASUREMENT OF VENOUS PRESSURE, CENTRAL, PERCUTANEOUS APPROACH: ICD-10-PCS | Performed by: NEUROLOGICAL SURGERY

## 2019-07-10 PROCEDURE — 94799 UNLISTED PULMONARY SVC/PX: CPT

## 2019-07-10 PROCEDURE — 25010000002 PROTAMINE SULFATE PER 10 MG: Performed by: ANESTHESIOLOGY

## 2019-07-10 PROCEDURE — 82947 ASSAY GLUCOSE BLOOD QUANT: CPT

## 2019-07-10 PROCEDURE — B316YZZ FLUOROSCOPY OF RIGHT INTERNAL CAROTID ARTERY USING OTHER CONTRAST: ICD-10-PCS | Performed by: NEUROLOGICAL SURGERY

## 2019-07-10 PROCEDURE — 25010000002 MIDAZOLAM PER 1 MG: Performed by: ANESTHESIOLOGY

## 2019-07-10 PROCEDURE — 82962 GLUCOSE BLOOD TEST: CPT

## 2019-07-10 PROCEDURE — 25010000003 LIDOCAINE 1 % SOLUTION 20 ML VIAL: Performed by: NEUROLOGICAL SURGERY

## 2019-07-10 DEVICE — CLOSED CELL SELF-EXPANDING STENT
Type: IMPLANTABLE DEVICE | Status: FUNCTIONAL
Brand: CAROTID WALLSTENT®

## 2019-07-10 RX ORDER — SODIUM CHLORIDE AND POTASSIUM CHLORIDE 150; 900 MG/100ML; MG/100ML
60 INJECTION, SOLUTION INTRAVENOUS CONTINUOUS
Status: DISCONTINUED | OUTPATIENT
Start: 2019-07-10 | End: 2019-07-11 | Stop reason: HOSPADM

## 2019-07-10 RX ORDER — LIDOCAINE HYDROCHLORIDE 20 MG/ML
INJECTION, SOLUTION INFILTRATION; PERINEURAL AS NEEDED
Status: DISCONTINUED | OUTPATIENT
Start: 2019-07-10 | End: 2019-07-10 | Stop reason: SURG

## 2019-07-10 RX ORDER — POTASSIUM CHLORIDE 29.8 MG/ML
INJECTION INTRAVENOUS CONTINUOUS PRN
Status: DISCONTINUED | OUTPATIENT
Start: 2019-07-10 | End: 2019-07-10 | Stop reason: SURG

## 2019-07-10 RX ORDER — PROMETHAZINE HYDROCHLORIDE 25 MG/1
25 TABLET ORAL ONCE AS NEEDED
Status: DISCONTINUED | OUTPATIENT
Start: 2019-07-10 | End: 2019-07-10 | Stop reason: HOSPADM

## 2019-07-10 RX ORDER — FENTANYL CITRATE 50 UG/ML
50 INJECTION, SOLUTION INTRAMUSCULAR; INTRAVENOUS
Status: DISCONTINUED | OUTPATIENT
Start: 2019-07-10 | End: 2019-07-10 | Stop reason: HOSPADM

## 2019-07-10 RX ORDER — HYDROCODONE BITARTRATE AND ACETAMINOPHEN 7.5; 325 MG/1; MG/1
1 TABLET ORAL ONCE AS NEEDED
Status: DISCONTINUED | OUTPATIENT
Start: 2019-07-10 | End: 2019-07-10 | Stop reason: HOSPADM

## 2019-07-10 RX ORDER — ACETAMINOPHEN 325 MG/1
650 TABLET ORAL ONCE AS NEEDED
Status: DISCONTINUED | OUTPATIENT
Start: 2019-07-10 | End: 2019-07-10 | Stop reason: HOSPADM

## 2019-07-10 RX ORDER — HYDROCHLOROTHIAZIDE 25 MG/1
25 TABLET ORAL DAILY
Status: DISCONTINUED | OUTPATIENT
Start: 2019-07-11 | End: 2019-07-11 | Stop reason: HOSPADM

## 2019-07-10 RX ORDER — MIDAZOLAM HYDROCHLORIDE 1 MG/ML
1 INJECTION INTRAMUSCULAR; INTRAVENOUS
Status: DISCONTINUED | OUTPATIENT
Start: 2019-07-10 | End: 2019-07-10 | Stop reason: HOSPADM

## 2019-07-10 RX ORDER — CHLORHEXIDINE GLUCONATE 4 G/100ML
SOLUTION TOPICAL DAILY PRN
Status: ON HOLD | COMMUNITY
End: 2019-07-10

## 2019-07-10 RX ORDER — HEPARIN SODIUM 1000 [USP'U]/ML
INJECTION, SOLUTION INTRAVENOUS; SUBCUTANEOUS AS NEEDED
Status: DISCONTINUED | OUTPATIENT
Start: 2019-07-10 | End: 2019-07-10 | Stop reason: SURG

## 2019-07-10 RX ORDER — DIPHENHYDRAMINE HYDROCHLORIDE 50 MG/ML
12.5 INJECTION INTRAMUSCULAR; INTRAVENOUS
Status: DISCONTINUED | OUTPATIENT
Start: 2019-07-10 | End: 2019-07-10 | Stop reason: HOSPADM

## 2019-07-10 RX ORDER — PROMETHAZINE HYDROCHLORIDE 25 MG/ML
12.5 INJECTION, SOLUTION INTRAMUSCULAR; INTRAVENOUS EVERY 6 HOURS PRN
Status: DISCONTINUED | OUTPATIENT
Start: 2019-07-10 | End: 2019-07-11 | Stop reason: HOSPADM

## 2019-07-10 RX ORDER — FAMOTIDINE 10 MG/ML
20 INJECTION, SOLUTION INTRAVENOUS ONCE
Status: COMPLETED | OUTPATIENT
Start: 2019-07-10 | End: 2019-07-10

## 2019-07-10 RX ORDER — PROMETHAZINE HYDROCHLORIDE 25 MG/1
25 SUPPOSITORY RECTAL ONCE AS NEEDED
Status: DISCONTINUED | OUTPATIENT
Start: 2019-07-10 | End: 2019-07-10 | Stop reason: HOSPADM

## 2019-07-10 RX ORDER — PROMETHAZINE HYDROCHLORIDE 25 MG/ML
6.25 INJECTION, SOLUTION INTRAMUSCULAR; INTRAVENOUS
Status: DISCONTINUED | OUTPATIENT
Start: 2019-07-10 | End: 2019-07-10 | Stop reason: HOSPADM

## 2019-07-10 RX ORDER — ONDANSETRON 2 MG/ML
INJECTION INTRAMUSCULAR; INTRAVENOUS AS NEEDED
Status: DISCONTINUED | OUTPATIENT
Start: 2019-07-10 | End: 2019-07-10 | Stop reason: SURG

## 2019-07-10 RX ORDER — CLOPIDOGREL BISULFATE 75 MG/1
300 TABLET ORAL ONCE
Status: COMPLETED | OUTPATIENT
Start: 2019-07-10 | End: 2019-07-10

## 2019-07-10 RX ORDER — SODIUM CHLORIDE, SODIUM LACTATE, POTASSIUM CHLORIDE, CALCIUM CHLORIDE 600; 310; 30; 20 MG/100ML; MG/100ML; MG/100ML; MG/100ML
9 INJECTION, SOLUTION INTRAVENOUS CONTINUOUS
Status: DISCONTINUED | OUTPATIENT
Start: 2019-07-10 | End: 2019-07-11 | Stop reason: HOSPADM

## 2019-07-10 RX ORDER — HYDROMORPHONE HYDROCHLORIDE 1 MG/ML
0.5 INJECTION, SOLUTION INTRAMUSCULAR; INTRAVENOUS; SUBCUTANEOUS
Status: DISCONTINUED | OUTPATIENT
Start: 2019-07-10 | End: 2019-07-10 | Stop reason: HOSPADM

## 2019-07-10 RX ORDER — SODIUM CHLORIDE 9 MG/ML
100 INJECTION, SOLUTION INTRAVENOUS CONTINUOUS
Status: DISCONTINUED | OUTPATIENT
Start: 2019-07-10 | End: 2019-07-10

## 2019-07-10 RX ORDER — EPHEDRINE SULFATE 50 MG/ML
5 INJECTION, SOLUTION INTRAVENOUS ONCE AS NEEDED
Status: DISCONTINUED | OUTPATIENT
Start: 2019-07-10 | End: 2019-07-10 | Stop reason: HOSPADM

## 2019-07-10 RX ORDER — NALOXONE HCL 0.4 MG/ML
0.2 VIAL (ML) INJECTION AS NEEDED
Status: DISCONTINUED | OUTPATIENT
Start: 2019-07-10 | End: 2019-07-10 | Stop reason: HOSPADM

## 2019-07-10 RX ORDER — OXYCODONE AND ACETAMINOPHEN 7.5; 325 MG/1; MG/1
1 TABLET ORAL ONCE AS NEEDED
Status: DISCONTINUED | OUTPATIENT
Start: 2019-07-10 | End: 2019-07-10 | Stop reason: HOSPADM

## 2019-07-10 RX ORDER — HYDRALAZINE HYDROCHLORIDE 20 MG/ML
5 INJECTION INTRAMUSCULAR; INTRAVENOUS
Status: DISCONTINUED | OUTPATIENT
Start: 2019-07-10 | End: 2019-07-10 | Stop reason: HOSPADM

## 2019-07-10 RX ORDER — DEXAMETHASONE SODIUM PHOSPHATE 10 MG/ML
INJECTION INTRAMUSCULAR; INTRAVENOUS AS NEEDED
Status: DISCONTINUED | OUTPATIENT
Start: 2019-07-10 | End: 2019-07-10 | Stop reason: SURG

## 2019-07-10 RX ORDER — CLOPIDOGREL BISULFATE 75 MG/1
150 TABLET ORAL DAILY
Status: DISCONTINUED | OUTPATIENT
Start: 2019-07-11 | End: 2019-07-11 | Stop reason: HOSPADM

## 2019-07-10 RX ORDER — LABETALOL HYDROCHLORIDE 5 MG/ML
5 INJECTION, SOLUTION INTRAVENOUS
Status: DISCONTINUED | OUTPATIENT
Start: 2019-07-10 | End: 2019-07-10 | Stop reason: HOSPADM

## 2019-07-10 RX ORDER — HYDROCODONE BITARTRATE AND ACETAMINOPHEN 5; 325 MG/1; MG/1
1 TABLET ORAL EVERY 4 HOURS PRN
Status: DISCONTINUED | OUTPATIENT
Start: 2019-07-10 | End: 2019-07-11 | Stop reason: HOSPADM

## 2019-07-10 RX ORDER — PROTAMINE SULFATE 10 MG/ML
INJECTION, SOLUTION INTRAVENOUS AS NEEDED
Status: DISCONTINUED | OUTPATIENT
Start: 2019-07-10 | End: 2019-07-10 | Stop reason: SURG

## 2019-07-10 RX ORDER — IODIXANOL 320 MG/ML
400 INJECTION, SOLUTION INTRAVASCULAR
Status: COMPLETED | OUTPATIENT
Start: 2019-07-10 | End: 2019-07-10

## 2019-07-10 RX ORDER — ONDANSETRON 2 MG/ML
4 INJECTION INTRAMUSCULAR; INTRAVENOUS EVERY 6 HOURS PRN
Status: DISCONTINUED | OUTPATIENT
Start: 2019-07-10 | End: 2019-07-11 | Stop reason: HOSPADM

## 2019-07-10 RX ORDER — PROMETHAZINE HYDROCHLORIDE 25 MG/ML
12.5 INJECTION, SOLUTION INTRAMUSCULAR; INTRAVENOUS ONCE AS NEEDED
Status: DISCONTINUED | OUTPATIENT
Start: 2019-07-10 | End: 2019-07-10 | Stop reason: HOSPADM

## 2019-07-10 RX ORDER — FENTANYL CITRATE 50 UG/ML
INJECTION, SOLUTION INTRAMUSCULAR; INTRAVENOUS AS NEEDED
Status: DISCONTINUED | OUTPATIENT
Start: 2019-07-10 | End: 2019-07-10 | Stop reason: SURG

## 2019-07-10 RX ORDER — ASPIRIN 81 MG/1
81 TABLET, CHEWABLE ORAL DAILY
Status: DISCONTINUED | OUTPATIENT
Start: 2019-07-11 | End: 2019-07-11 | Stop reason: HOSPADM

## 2019-07-10 RX ORDER — ROCURONIUM BROMIDE 10 MG/ML
INJECTION, SOLUTION INTRAVENOUS AS NEEDED
Status: DISCONTINUED | OUTPATIENT
Start: 2019-07-10 | End: 2019-07-10 | Stop reason: SURG

## 2019-07-10 RX ORDER — SODIUM CHLORIDE 0.9 % (FLUSH) 0.9 %
1-10 SYRINGE (ML) INJECTION AS NEEDED
Status: DISCONTINUED | OUTPATIENT
Start: 2019-07-10 | End: 2019-07-10 | Stop reason: HOSPADM

## 2019-07-10 RX ORDER — FLUMAZENIL 0.1 MG/ML
0.2 INJECTION INTRAVENOUS AS NEEDED
Status: DISCONTINUED | OUTPATIENT
Start: 2019-07-10 | End: 2019-07-10 | Stop reason: HOSPADM

## 2019-07-10 RX ORDER — DIPHENHYDRAMINE HCL 25 MG
25 CAPSULE ORAL
Status: DISCONTINUED | OUTPATIENT
Start: 2019-07-10 | End: 2019-07-10 | Stop reason: HOSPADM

## 2019-07-10 RX ORDER — MIDAZOLAM HYDROCHLORIDE 1 MG/ML
2 INJECTION INTRAMUSCULAR; INTRAVENOUS
Status: DISCONTINUED | OUTPATIENT
Start: 2019-07-10 | End: 2019-07-10 | Stop reason: HOSPADM

## 2019-07-10 RX ORDER — LIDOCAINE HYDROCHLORIDE 10 MG/ML
0.5 INJECTION, SOLUTION EPIDURAL; INFILTRATION; INTRACAUDAL; PERINEURAL ONCE AS NEEDED
Status: DISCONTINUED | OUTPATIENT
Start: 2019-07-10 | End: 2019-07-10 | Stop reason: HOSPADM

## 2019-07-10 RX ORDER — PROPOFOL 10 MG/ML
VIAL (ML) INTRAVENOUS AS NEEDED
Status: DISCONTINUED | OUTPATIENT
Start: 2019-07-10 | End: 2019-07-10 | Stop reason: SURG

## 2019-07-10 RX ORDER — ACETAMINOPHEN 325 MG/1
650 TABLET ORAL EVERY 4 HOURS PRN
Status: DISCONTINUED | OUTPATIENT
Start: 2019-07-10 | End: 2019-07-11 | Stop reason: HOSPADM

## 2019-07-10 RX ORDER — ONDANSETRON 2 MG/ML
4 INJECTION INTRAMUSCULAR; INTRAVENOUS ONCE AS NEEDED
Status: DISCONTINUED | OUTPATIENT
Start: 2019-07-10 | End: 2019-07-10 | Stop reason: HOSPADM

## 2019-07-10 RX ADMIN — POTASSIUM CHLORIDE: 29.8 INJECTION, SOLUTION INTRAVENOUS at 10:15

## 2019-07-10 RX ADMIN — HYDROCODONE BITARTRATE AND ACETAMINOPHEN 1 TABLET: 5; 325 TABLET ORAL at 20:13

## 2019-07-10 RX ADMIN — FENTANYL CITRATE 100 MCG: 50 INJECTION INTRAMUSCULAR; INTRAVENOUS at 09:52

## 2019-07-10 RX ADMIN — CLOPIDOGREL BISULFATE 300 MG: 75 TABLET ORAL at 08:53

## 2019-07-10 RX ADMIN — MIDAZOLAM 2 MG: 1 INJECTION INTRAMUSCULAR; INTRAVENOUS at 09:39

## 2019-07-10 RX ADMIN — FENTANYL CITRATE 50 MCG: 50 INJECTION INTRAMUSCULAR; INTRAVENOUS at 13:25

## 2019-07-10 RX ADMIN — ROCURONIUM BROMIDE 40 MG: 10 INJECTION INTRAVENOUS at 09:52

## 2019-07-10 RX ADMIN — PROMETHAZINE HYDROCHLORIDE 12.5 MG: 25 INJECTION INTRAMUSCULAR; INTRAVENOUS at 20:44

## 2019-07-10 RX ADMIN — PROTAMINE SULFATE 70 MG: 10 INJECTION, SOLUTION INTRAVENOUS at 11:42

## 2019-07-10 RX ADMIN — SODIUM CHLORIDE, POTASSIUM CHLORIDE, SODIUM LACTATE AND CALCIUM CHLORIDE 9 ML/HR: 600; 310; 30; 20 INJECTION, SOLUTION INTRAVENOUS at 08:07

## 2019-07-10 RX ADMIN — PROTAMINE SULFATE 10 MG: 10 INJECTION, SOLUTION INTRAVENOUS at 11:37

## 2019-07-10 RX ADMIN — SUGAMMADEX 200 MG: 100 INJECTION, SOLUTION INTRAVENOUS at 11:39

## 2019-07-10 RX ADMIN — HEPARIN SODIUM 8000 UNITS: 1000 INJECTION, SOLUTION INTRAVENOUS; SUBCUTANEOUS at 10:44

## 2019-07-10 RX ADMIN — SODIUM CHLORIDE 400 ML: 9 INJECTION, SOLUTION INTRAVENOUS at 12:15

## 2019-07-10 RX ADMIN — ONDANSETRON 4 MG: 2 INJECTION INTRAMUSCULAR; INTRAVENOUS at 11:39

## 2019-07-10 RX ADMIN — PROPOFOL 200 MG: 10 INJECTION, EMULSION INTRAVENOUS at 09:52

## 2019-07-10 RX ADMIN — DEXAMETHASONE SODIUM PHOSPHATE 8 MG: 10 INJECTION INTRAMUSCULAR; INTRAVENOUS at 10:18

## 2019-07-10 RX ADMIN — POTASSIUM CHLORIDE AND SODIUM CHLORIDE 60 ML/HR: 900; 150 INJECTION, SOLUTION INTRAVENOUS at 16:48

## 2019-07-10 RX ADMIN — ONDANSETRON HYDROCHLORIDE 4 MG: 2 SOLUTION INTRAMUSCULAR; INTRAVENOUS at 17:59

## 2019-07-10 RX ADMIN — POTASSIUM CHLORIDE: 29.8 INJECTION, SOLUTION INTRAVENOUS at 10:34

## 2019-07-10 RX ADMIN — FAMOTIDINE 20 MG: 10 INJECTION INTRAVENOUS at 09:38

## 2019-07-10 RX ADMIN — LIDOCAINE HYDROCHLORIDE 100 MG: 20 INJECTION, SOLUTION INFILTRATION; PERINEURAL at 09:52

## 2019-07-10 RX ADMIN — IODIXANOL 139.8 ML: 320 INJECTION, SOLUTION INTRAVASCULAR at 12:03

## 2019-07-10 NOTE — BRIEF OP NOTE
EMBOLIZATION CEREBRAL  Progress Note    Marie Bernabe  7/10/2019    Pre-op Diagnosis:   Transverse sinus thrombosis [G08]  Pseudotumor cerebri [G93.2]       Post-Op Diagnosis Codes:     * Transverse sinus thrombosis [G08]     * Pseudotumor cerebri [G93.2]    Procedure/CPT® Codes:      Procedure(s):  Cerebral arteriography and venography with placement of a transverse sinus stent and angioplasty.    Surgeon(s):  Maico Groves MD Paulsen, Richard, MD    Anesthesia: General    Staff:   Circulator: Tapan Bennett RN  Radiology Technologist: Lamont Garcia  Scrub Person: Demetrius Butler  Assistant: Daya White CSA  Vascular Radiology Technician: Reji Sams    Estimated Blood Loss: minimal    Urine Voided: 400 mL    Specimens:                None          Drains:      Findings: Transverse sinus stenosis. After stent placement, normalization of intracranial pressure    Complications: none      Maico Groves MD     Date: 7/10/2019  Time: 11:51 AM

## 2019-07-10 NOTE — ANESTHESIA PROCEDURE NOTES
Airway  Urgency: elective    Airway not difficult    General Information and Staff    Patient location during procedure: OR  Anesthesiologist: Santhosh Hudson MD    Indications and Patient Condition  Indications for airway management: airway protection    Preoxygenated: yes  Mask difficulty assessment: 1 - vent by mask    Final Airway Details  Final airway type: endotracheal airway      Successful airway: ETT  Cuffed: yes   Successful intubation technique: direct laryngoscopy  Endotracheal tube insertion site: oral  Blade: Yuval  Blade size: 4  ETT size (mm): 7.5  Cormack-Lehane Classification: grade I - full view of glottis  Placement verified by: chest auscultation and capnometry   Measured from: teeth  ETT to teeth (cm): 21  Number of attempts at approach: 1

## 2019-07-10 NOTE — ANESTHESIA POSTPROCEDURE EVALUATION
Patient: Marie Bernabe    Procedure Summary     Date:  07/10/19 Room / Location:  [facility] OR 19 INV / [facility] HYBRID OR 18/19    Anesthesia Start:  0947 Anesthesia Stop:  1201    Procedure:  Cerebral arteriography and venography with placement of a transverse sinus stent and angioplasty. (N/A ) Diagnosis:       Transverse sinus thrombosis      Pseudotumor cerebri      (Transverse sinus thrombosis [G08])      (Pseudotumor cerebri [G93.2])    Surgeon:  Maico Groves MD Provider:  Santhosh Hudson MD    Anesthesia Type:  general ASA Status:  2          Anesthesia Type: general  Last vitals  BP   132/90 (07/10/19 1200)   Temp   36.9 °C (98.4 °F) (07/10/19 1200)   Pulse   90 (07/10/19 1200)   Resp   18 (07/10/19 1200)     SpO2   93 % (07/10/19 1200)     Post Anesthesia Care and Evaluation    Patient location during evaluation: PACU  Patient participation: complete - patient participated  Level of consciousness: awake and alert  Pain management: adequate  Airway patency: patent  Anesthetic complications: No anesthetic complications    Cardiovascular status: acceptable  Respiratory status: acceptable  Hydration status: acceptable    Comments: ---------------------------               07/10/19                      1200         ---------------------------   BP:          132/90         Pulse:         90           Resp:          18           Temp:   36.9 °C (98.4 °F)   SpO2:          93%         ---------------------------

## 2019-07-10 NOTE — PERIOPERATIVE NURSING NOTE
DR. ARREGUIN OVER TO SEE PT AND STATES DR. EASTMAN WANTING TO GO TO OR WITHOUT LAB RESULTS OR CARIE.  PT AND HER DGT UPDATED

## 2019-07-10 NOTE — CONSULTS
Referring Provider: Dr. Groves  Reason for Consultation: ICU care    Patient Care Team:  Corey Montiel MD as PCP - General (Family Medicine)    Chief complaint:   Post cerebral arteriography     History of present illness:    Subjective   This is a 39-year-old female patient with history of pseudotumor cerebri who follows with Dr. Harper's.  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  Her symptoms manifested as blurry vision and loss of vision on the right side of her visual field.  She had worsening intermittent symptoms over the last 2-month with no clear trigger and no exacerbating or relieving factors.  Associated symptoms include headache and sensation of head pressure but no other neurological symptoms.  She was evaluated again by Dr. Groves and underwent lumbar puncture without relief so it was decided to proceed with surgery.    Patient underwent Cerebral arteriography and venography with placement of a transverse sinus stent and angioplasty today by Dr. Groves.     I saw her postoperatively in the intensive care unit.  She complained of minimal headache and mild nausea.  She is requiring oxygen 2 L/min.  Her SPO2 was 88% on room air earlier.  She does not use oxygen at home.    Labs from 7/3/2019 reviewed and pertinent for bicarb 31 and potassium 3.1    Review of Systems  Constitutional: No fever or chills.  No change in appetite  ENMT: No sinus congestion or postnasal drip.  Denies snoring  Cardiovascular: No chest pain, palpitation or legs swelling.    Respiratory: No dyspnea, cough or wheezing.  Gastrointestinal: No constipation, diarrhea or abdominal pain. reported mild nausea but no vomiting.  Neurology: No headache, weakness, numbness or dizziness.   Musculoskeletal: No joints pain, stiffness or swelling.   Psychiatry: No depression.  Genitourinary: No dysuria  or frequent urination  Endo: No weight changes. No cold or warm intolerance.  Lymphatic: No swollen glands.  Integumentary: No rash.    History  Past Medical History:   Diagnosis Date   • Anxiety    • Blurred vision    • Depression    • Hydrocephalus    • Hypertension    • Pseudotumor cerebri    • Spinal headache     AFTER SPINAL TAP.   ,   Past Surgical History:   Procedure Laterality Date   • EMBOLIZATION CEREBRAL Left 3/14/2016    Procedure: CEREBRAL ANGIOGRAM W/ LT LUMBAR PUNCTURE;  Surgeon: Maico Groves MD;  Location: [facility] OR 18/19;  Service:    • EMBOLIZATION CEREBRAL Right 5/25/2016    Procedure: CEREBRAL ANGIOGRAM AND VENOGRAM ;  Surgeon: Maico Groves MD;  Location: [facility] OR 18/19;  Service:    • LUMBAR PUNCTURE  03/14/2016    Dr. Groves   • TUBAL ABDOMINAL LIGATION     ,   Family History   Problem Relation Age of Onset   • Cancer Maternal Grandmother         breast/passed   • Heart disease Maternal Grandfather    • Heart disease Paternal Grandfather    • Malig Hyperthermia Neg Hx    ,   Social History     Tobacco Use   • Smoking status: Never Smoker   • Smokeless tobacco: Never Used   Substance Use Topics   • Alcohol use: No   • Drug use: No   ,   Medications Prior to Admission   Medication Sig Dispense Refill Last Dose   • aspirin 81 MG chewable tablet Chew 81 mg Daily.   7/10/2019 at 0730   • clopidogrel (PLAVIX) 75 MG tablet Take 1 tablet by mouth Daily. 30 tablet 6 7/10/2019 at 0620   • hydrochlorothiazide (HYDRODIURIL) 25 MG tablet Take 25 mg by mouth Daily.  5 7/10/2019 at 0620   , Scheduled Meds:    [START ON 7/11/2019] aspirin 81 mg Oral Daily   [START ON 7/11/2019] clopidogrel 150 mg Oral Daily   [START ON 7/11/2019] hydrochlorothiazide 25 mg Oral Daily   , Continuous Infusions:    lactated ringers 9 mL/hr Last Rate: 9 mL/hr (07/10/19 0807)   sodium chloride 0.9 % with KCl 20 mEq 60 mL/hr Last Rate: 60 mL/hr (07/10/19 1648)    and Allergies:  Patient has no known  allergies.    Objective     Vital Signs   Temp:  [97.3 °F (36.3 °C)-98.4 °F (36.9 °C)] 97.3 °F (36.3 °C)  Heart Rate:  [] 110  Resp:  [15-18] 15  BP: (129-157)/() 133/87  Arterial Line BP: (129-158)/(61-81) 146/72    Physical Exam:  Constitutional: Not in acute distress.  Eyes: Injected conjunctiva, EOMI.  ENMT: Ann and Mallampati 3. No oral thrush. Tonsils grade  Neck: Large. Trachea midline. No thyromegaly  Heart: RRR, no murmur  Lungs: Good and equal air entry bilaterally.  Non labored breathing.   Abdomen: Obese. Soft. No tenderness or dullness. No HSM.  Extremities: No cyanosis, clubbing or pitting edema: . Moves all extremities.  Neuro: Conscious, alert, oriented x3.  Strength 5/5 in upper and lower extremities.  Visual field intact.  No major sensory deficits in arms and legs.  Psych: Appropriate mood and affect.    Integumentary: No rash or ecchymosis.  Normal skin turgor.  Lymphatic: No palpable cervical or supraclavicular lymph nodes.      Diagnostic imaging:  I reviewed the MRI report dated 4/3/2019.          Assessment   1. 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 Essential hypertension  3. Obesity (BMI 35)   4. Hypokalemia on recent labs    Recommendations:  · Check BMP in a.m.  · Neuro check.  ICU monitoring.  Monitor blood pressure and will start nicardipine if needed.  Otherwise resume her hydrochlorothiazide 25 mg.  If she still hypokalemic we may need to switch her to combination of hydrochlorothiazide and lisinopril or supplement her with potassium regularly.  · DVT prophylaxis with SCD            Shira Green MD  07/10/19  5:53 PM

## 2019-07-10 NOTE — ANESTHESIA PROCEDURE NOTES
Arterial Line      Patient reassessed immediately prior to procedure    Patient location during procedure: OR  Start time: 7/10/2019 9:50 AM  Stop Time:7/10/2019 9:52 AM       Line placed for hemodynamic monitoring and ABGs/Labs/ISTAT.  Performed By   Anesthesiologist: Santhosh Hudson MD  Preanesthetic Checklist  Completed: patient identified, site marked, surgical consent, pre-op evaluation, timeout performed, IV checked, risks and benefits discussed and monitors and equipment checked  Arterial Line Prep   Sterile Tech: gloves, mask, cap and sterile barriers  Prep: ChloraPrep  Patient monitoring: blood pressure monitoring, continuous pulse oximetry and EKG  Arterial Line Procedure   Laterality:left  Location:  radial artery  Catheter size: 20 G   Guidance: ultrasound guided  PROCEDURE NOTE/ULTRASOUND INTERPRETATION.  Using ultrasound guidance the potential vascular sites for insertion of the catheter were visualized to determine the patency of the vessel to be used for vascular access.  After selecting the appropriate site for insertion, the needle was visualized under ultrasound being inserted into the radial artery, followed by ultrasound confirmation of wire and catheter placement. There were no abnormalities seen on ultrasound; an image was taken; and the patient tolerated the procedure with no complications.   Number of attempts: 1  Successful placement: yes  Post Assessment   Dressing Type: occlusive dressing applied, secured with tape and wrist guard applied.   Complications no  Circ/Move/Sens Assessment: normal and unchanged.   Patient Tolerance: patient tolerated the procedure well with no apparent complications

## 2019-07-10 NOTE — PLAN OF CARE
Problem: Patient Care Overview  Goal: Plan of Care Review  Outcome: Ongoing (interventions implemented as appropriate)   07/10/19 1704   Coping/Psychosocial   Plan of Care Reviewed With patient;daughter   Plan of Care Review   Progress improving   OTHER   Outcome Summary Neuro intact no deficits. Intermittent nausea post op but refused medication. Hr 110s B/P stable SBP 130s. Rt groin site soft and dry w/o hematoma. Peripheral pulses palp, extremities warm to touch.       Problem: Skin Injury Risk (Adult)  Goal: Identify Related Risk Factors and Signs and Symptoms  Outcome: Ongoing (interventions implemented as appropriate)   07/10/19 1704   Skin Injury Risk (Adult)   Related Risk Factors (Skin Injury Risk) critical care admission;medical devices       Problem: Fall Risk (Adult)  Goal: Identify Related Risk Factors and Signs and Symptoms  Outcome: Ongoing (interventions implemented as appropriate)   07/10/19 1704   Fall Risk (Adult)   Related Risk Factors (Fall Risk) depression/anxiety;fatigue/slow reaction;impaired vision;inadequate lighting;sleep pattern alteration;environment unfamiliar   Signs and Symptoms (Fall Risk) presence of risk factors

## 2019-07-10 NOTE — ANESTHESIA PREPROCEDURE EVALUATION
Anesthesia Evaluation     Patient summary reviewed and Nursing notes reviewed   no history of anesthetic complications:  NPO Solid Status: > 8 hours  NPO Liquid Status: > 2 hours           Airway   Mallampati: II  TM distance: >3 FB  Neck ROM: full  no difficulty expected  Dental - normal exam     Pulmonary - negative pulmonary ROS and normal exam   (-) COPD, asthma, not a smoker, lung cancer  Cardiovascular - normal exam  Exercise tolerance: excellent (>7 METS)    ECG reviewed  PT is on anticoagulation therapy  Rhythm: regular  Rate: normal    (+) hypertension well controlled less than 2 medications, PVD,   (-) valvular problems/murmurs, past MI, CAD, dysrhythmias, angina, cardiac stents, CABG      Neuro/Psych  (+) headaches, psychiatric history Anxiety and Depression,     (-) seizures, TIA, CVA  GI/Hepatic/Renal/Endo - negative ROS   (-) hiatal hernia, GERD, PUD, hepatitis, liver disease, no renal disease, diabetes, hypothyroidism, GI bleed    Musculoskeletal (-) negative ROS    Abdominal  - normal exam   Substance History - negative use     OB/GYN negative ob/gyn ROS         Other - negative ROS                       Anesthesia Plan    ASA 2     general   (GA w/ A-line)  intravenous induction   Anesthetic plan, all risks, benefits, and alternatives have been provided, discussed and informed consent has been obtained with: patient.    Plan discussed with CRNA and attending.

## 2019-07-11 VITALS
HEART RATE: 102 BPM | WEIGHT: 202.38 LBS | HEIGHT: 63 IN | TEMPERATURE: 98 F | SYSTOLIC BLOOD PRESSURE: 140 MMHG | DIASTOLIC BLOOD PRESSURE: 85 MMHG | OXYGEN SATURATION: 94 % | RESPIRATION RATE: 16 BRPM | BODY MASS INDEX: 35.86 KG/M2

## 2019-07-11 LAB
ANION GAP SERPL CALCULATED.3IONS-SCNC: 9.3 MMOL/L (ref 5–15)
BUN BLD-MCNC: 5 MG/DL (ref 6–20)
BUN/CREAT SERPL: 11.1 (ref 7–25)
CALCIUM SPEC-SCNC: 7.7 MG/DL (ref 8.6–10.5)
CHLORIDE SERPL-SCNC: 103 MMOL/L (ref 98–107)
CO2 SERPL-SCNC: 28.7 MMOL/L (ref 22–29)
CREAT BLD-MCNC: 0.45 MG/DL (ref 0.57–1)
GFR SERPL CREATININE-BSD FRML MDRD: >150 ML/MIN/1.73
GLUCOSE BLD-MCNC: 110 MG/DL (ref 65–99)
POTASSIUM BLD-SCNC: 3.4 MMOL/L (ref 3.5–5.2)
SODIUM BLD-SCNC: 141 MMOL/L (ref 136–145)

## 2019-07-11 PROCEDURE — 80048 BASIC METABOLIC PNL TOTAL CA: CPT | Performed by: INTERNAL MEDICINE

## 2019-07-11 PROCEDURE — 99024 POSTOP FOLLOW-UP VISIT: CPT | Performed by: NURSE PRACTITIONER

## 2019-07-11 RX ORDER — POTASSIUM CHLORIDE 1.5 G/1.77G
40 POWDER, FOR SOLUTION ORAL AS NEEDED
Status: DISCONTINUED | OUTPATIENT
Start: 2019-07-11 | End: 2019-07-11 | Stop reason: HOSPADM

## 2019-07-11 RX ORDER — POTASSIUM CHLORIDE 750 MG/1
20 CAPSULE, EXTENDED RELEASE ORAL DAILY
Status: DISCONTINUED | OUTPATIENT
Start: 2019-07-12 | End: 2019-07-11 | Stop reason: HOSPADM

## 2019-07-11 RX ORDER — POTASSIUM CHLORIDE 750 MG/1
20 CAPSULE, EXTENDED RELEASE ORAL DAILY
Qty: 60 CAPSULE | Refills: 0 | Status: SHIPPED | OUTPATIENT
Start: 2019-07-12 | End: 2020-06-01

## 2019-07-11 RX ORDER — CLOPIDOGREL BISULFATE 75 MG/1
150 TABLET ORAL DAILY
Qty: 60 TABLET | Refills: 6 | Status: SHIPPED | OUTPATIENT
Start: 2019-07-11 | End: 2019-07-24 | Stop reason: SDUPTHER

## 2019-07-11 RX ORDER — POTASSIUM CHLORIDE 750 MG/1
40 CAPSULE, EXTENDED RELEASE ORAL AS NEEDED
Status: DISCONTINUED | OUTPATIENT
Start: 2019-07-11 | End: 2019-07-11 | Stop reason: HOSPADM

## 2019-07-11 RX ORDER — ACETAMINOPHEN 325 MG/1
650 TABLET ORAL EVERY 4 HOURS PRN
Start: 2019-07-11 | End: 2020-06-01

## 2019-07-11 RX ADMIN — POTASSIUM CHLORIDE 40 MEQ: 750 CAPSULE, EXTENDED RELEASE ORAL at 08:59

## 2019-07-11 RX ADMIN — CLOPIDOGREL 150 MG: 75 TABLET, FILM COATED ORAL at 08:59

## 2019-07-11 RX ADMIN — POTASSIUM CHLORIDE 40 MEQ: 750 CAPSULE, EXTENDED RELEASE ORAL at 14:13

## 2019-07-11 RX ADMIN — ASPIRIN 81 MG: 81 TABLET, CHEWABLE ORAL at 08:59

## 2019-07-11 RX ADMIN — HYDROCHLOROTHIAZIDE 25 MG: 25 TABLET ORAL at 08:58

## 2019-07-11 NOTE — DISCHARGE SUMMARY
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       Complications: none    Consultants:   Consults     Date and Time Order Name Status Description    7/10/2019 1507 Inpatient Consult to Intensivist Completed           Condition on Discharge: stable    Discharge disposition: home    Pertinent Test Results: labs: P2Y12- 170    Brief HPI: Patient evaluated in office for complaints of headaches and visual changes secondary to pseudotumor cerebri with right transverse sinus stenosis and left transverse sinus occlusion and increased intracranial pressures.  She did attempted transverse sinus stent placement in May 2016 but was aborted patient safety.  There was consideration of LP shunt, but patient's symptoms improved and it was canceled. Imaging revealed empty sella and LP showed opening pressure 48. RBAs of treatment were discussed including the above procedure. Patient consented to above procedure.    Hospital Course: Patient admitted for above procedure. The procedure itself was without complication. The patient was transferred to ICU following recovery.  This morning she reports improvement in headaches.  No visual complaints.  Denies any leg pain or numbness.  Desires to go home.  Has tolerated diet.  Nursing reports that her O2 sats have decreased some with activity just in the bed.  She is been encouraged to use her I-S which helps.  She has been evaluated by pulmonologist.  She does tell me today that about a month ago she spoke to her family doctor about having some shortness of air with exertion.  No chest pain.  She has had a dry cough.   No significant allergy issues.  She denies need for narcotic.  Asked nursing to relate patient and monitor walking oximetry.  If it is below 90, they will notify the pulmonologist following the patient.  She maintains adequate saturations, she can be discharged and follow-up with family doctor.    Discharge Physical Exam:    Temp:  [97.3 °F (36.3 °C)-98.4 °F (36.9 °C)] 98 °F (36.7 °C)  Heart Rate:  [] 107  Resp:  [14-18] 17  BP: (105-149)/() 134/69  Arterial Line BP: ()/(60-95) 146/74    Current labs:  Lab Results (last 24 hours)     Procedure Component Value Units Date/Time    POC Glucose Once [[id number]]  (Abnormal) Collected:  07/10/19 1459    Specimen:  Blood Updated:  07/10/19 1505     Glucose 143 mg/dL     Basic Metabolic Panel [[id number]]  (Abnormal) Collected:  07/11/19 0520    Specimen:  Blood Updated:  07/11/19 0605     Glucose 110 mg/dL      BUN 5 mg/dL      Creatinine 0.45 mg/dL      Sodium 141 mmol/L      Potassium 3.4 mmol/L      Chloride 103 mmol/L      CO2 28.7 mmol/L      Calcium 7.7 mg/dL      eGFR Non African Amer >150 mL/min/1.73      BUN/Creatinine Ratio 11.1     Anion Gap 9.3 mmol/L     Narrative:       GFR Normal >60  Chronic Kidney Disease <60  Kidney Failure <15            General Appearance No acute distress   HEENT NC/AT;    Neurological Awake, Alert, and oriented x 3   Cranial nerves  EOMI.  Visual fields intact.  Face symmetric.  Tongue midline   Motor  no drift   Cerebellar  finger-to-nose intact   Neck Supple   Extremities Moves all extremities well, no edema, no cyanosis, no redness; right groin site with clean dry intact dressing.  Distal pulses 2+.  Pink warm dry extremity         Discharge Medications  Lei has been reviewed and narcotic consent is on file in the patient's chart.     Your medication list      START taking these medications      Instructions Last Dose Given Next Dose Due   acetaminophen 325 MG tablet  Commonly known as:  TYLENOL      Take 2  tablets by mouth Every 4 (Four) Hours As Needed for Mild Pain .       potassium chloride 10 MEQ CR capsule  Commonly known as:  MICRO-K  Start taking on:  7/12/2019      Take 2 capsules by mouth Daily.          CHANGE how you take these medications      Instructions Last Dose Given Next Dose Due   clopidogrel 75 MG tablet  Commonly known as:  PLAVIX  What changed:  how much to take      Take 2 tablets by mouth Daily.          CONTINUE taking these medications      Instructions Last Dose Given Next Dose Due   aspirin 81 MG chewable tablet      Chew 81 mg Daily.       hydrochlorothiazide 25 MG tablet  Commonly known as:  HYDRODIURIL      Take 25 mg by mouth Daily.             Where to Get Your Medications      These medications were sent to [facility] - [address] [address]    Hours:  7:00AM-6PM Mon-Fri Phone:  [phone]   · clopidogrel 75 MG tablet     These medications were sent to [facility]/pharmacy #[id number] - [place], [place] - [address] [facility] - [phone]  - [phone] [address] [address]    Phone:  [phone]   · potassium chloride 10 MEQ CR capsule     Information about where to get these medications is not yet available    Ask your nurse or doctor about these medications  · acetaminophen 325 MG tablet         Discharge Diet:   Diet Instructions     Diet:      Diet Texture / Consistency:  Regular    Advance as tolerated        Diet Order   Procedures   • Diet Regular       Activity at Discharge:   Activity Instructions     Discharge Activity      1) No driving until cleared by us and no longer taking narcotics.   2) Off work/school until cleared by us.  3) May shower but no submerging wound in tub, pool, etc.  4) Do not lift / push / pull more then 10 lbs for 1 week.  5.) No exertional activity or impact aerobics    Pelvic Rest            Call for: questions or concerns    Follow-up Appointments  Future Appointments   Date Time Provider  "[facility]   7/24/2019  9:45 AM Susan Martin, APRN [facility] None     Follow-up Information     Corey Montiel MD Follow up.    Specialty:  Family Medicine  Contact information:  [address]  [place] [address] [phone]                 Additional Instructions for the Follow-ups that You Need to Schedule     Notify Physician or Go To The ED For the Following Conditions   As directed      Including but not limited to fever >100.5, chills, wound concerns (redness, swelling, drainage), new symptoms of numbness, tingling, weakness; new or uncontrolled pain despite using prescribed medications    Order Comments:  Including but not limited to fever >100.5, chills, wound concerns (redness, swelling, drainage), new symptoms of numbness, tingling, weakness; new or uncontrolled pain despite using prescribed medications                Test Results Pending at Discharge     None    I discussed the discharge instructions with patient and family    CHRISTAL Hinojosa  07/11/19  2:54 PM    20 min spent in reviewing records, discussion and examination of the patient and discussion with other members of the patient's medical team.    \"Dictated utilizing Dragon dictation\".      "

## 2019-07-11 NOTE — NURSING NOTE
Pt ambulated around nurses station on room air . No difficulty breathing O2 sats 90-92%. Denies weakness or dizziness. Back to room up in chair. Eliel ambulation well.

## 2019-07-11 NOTE — PROGRESS NOTES
"                                              LOS: 1 day   Patient Care Team:  Corey Montiel MD as PCP - General (Family Medicine)    Chief Complaint:  F/up medical management in ICU post cerebral angiography, hypertension and medical problems listed below    Subjective   Interval History  Doing well.  Had mild head pressure but no headache, nausea or vomiting.  No weakness or numbness.  No other neurological symptoms.  Potassium is low.  She still on oxygen 2 L/min    REVIEW OF SYSTEMS:   CARDIOVASCULAR: No chest pain, chest pressure or chest discomfort. No palpitations or edema.   RESPIRATORY: No shortness of breath, cough or sputum.       Ventilator/Non-Invasive Ventilation Settings (From admission, onward)    None                Physical Exam:     Vital Signs  Temp:  [97.3 °F (36.3 °C)-98.4 °F (36.9 °C)] 98 °F (36.7 °C)  Heart Rate:  [] 105  Resp:  [14-18] 16  BP: (105-149)/(59-96) 149/75  Arterial Line BP: ()/(60-88) 139/73    Intake/Output Summary (Last 24 hours) at 7/11/2019 0945  Last data filed at 7/11/2019 0603  Gross per 24 hour   Intake 1710 ml   Output 2450 ml   Net -740 ml     Flowsheet Rows      First Filed Value   Admission Height  160 cm (63\") Documented at 07/10/2019 0755   Admission Weight  91.8 kg (202 lb 6 oz) Documented at 07/10/2019 0755          General Appearance:    Alert, cooperative, in no acute distress   ENMT:  Mallampati score 3, moist mucous membrane   Eyes: Pupils equals and reactive to light. EOMI   Neck:   Large. Trachea midline. No thyromegaly.   Lungs:     Clear to auscultation,respirations regular, even and                  unlabored    Heart:    Regular rhythm and normal rate, normal S1 and S2, no            murmur   Skin:    No abnormalities observed   Abdomen:     Obese. Soft. No tenderness. No HSM.   Neuro:   Conscious, alert, oriented x3. Strength 5/5 in upper and lower ext   Extremities:   Moves all extremities well, no edema, no cyanosis, no             " Redness          Results Review:        Results from last 7 days   Lab Units 07/11/19  0520   SODIUM mmol/L 141   POTASSIUM mmol/L 3.4*   CHLORIDE mmol/L 103   CO2 mmol/L 28.7   BUN mg/dL 5*   CREATININE mg/dL 0.45*   GLUCOSE mg/dL 110*   CALCIUM mg/dL 7.7*                       I reviewed the patient's new clinical results.  I personally viewed and interpreted the patient's CXR        Medication Review:     aspirin 81 mg Oral Daily   clopidogrel 150 mg Oral Daily   hydrochlorothiazide 25 mg Oral Daily         lactated ringers 9 mL/hr Last Rate: 9 mL/hr (07/10/19 0807)   sodium chloride 0.9 % with KCl 20 mEq 60 mL/hr Last Rate: 60 mL/hr (07/10/19 1648)         Assessment   1. 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 Essential hypertension  3. Obesity (BMI 35)   4. Hypokalemia  5. Hypoxia, likely atelectasis postoperatively    Plan   · Replace potassium orally.  Recommend daily KCl 20 lack at home while being on hydrochlorothiazide for HTN.\  · Continue HCTZ  · Provide with incentive spirometry for hypoxia.  · We will get a walking oximetry to make sure she is not requiring oxygen on exertion      Okay to discharge          Shira Green MD  07/11/19  9:45 AM          This note was dictated utilizing Dragon dictation

## 2019-07-11 NOTE — PLAN OF CARE
Problem: Patient Care Overview  Goal: Plan of Care Review  Outcome: Ongoing (interventions implemented as appropriate)   07/10/19 1704 07/11/19 0400 07/11/19 0744   Coping/Psychosocial   Plan of Care Reviewed With --  patient;daughter --    Plan of Care Review   Progress improving --  --    OTHER   Outcome Summary --  --  VS stable, daughter at bedside, 1 Norco and 1 dose of Phenergan 7p-7a covered pt's pain and n/v for the whole shift, anticipating pt having claros and a-line removed this shift (7a-7p) and being overflow, she remains neurologically intact with R groins site soft/dry     Goal: Individualization and Mutuality  Outcome: Ongoing (interventions implemented as appropriate)    Goal: Discharge Needs Assessment  Outcome: Ongoing (interventions implemented as appropriate)    Goal: Interprofessional Rounds/Family Conf  Outcome: Ongoing (interventions implemented as appropriate)      Problem: Skin Injury Risk (Adult)  Goal: Identify Related Risk Factors and Signs and Symptoms  Outcome: Ongoing (interventions implemented as appropriate)    Goal: Skin Health and Integrity  Outcome: Ongoing (interventions implemented as appropriate)      Problem: Fall Risk (Adult)  Goal: Identify Related Risk Factors and Signs and Symptoms  Outcome: Ongoing (interventions implemented as appropriate)    Goal: Absence of Fall  Outcome: Ongoing (interventions implemented as appropriate)

## 2019-07-11 NOTE — PROGRESS NOTES
Discharge Planning Assessment  [facility]     Patient Name: Marie Bernabe  MRN: [id number]  Today's Date: 7/11/2019    Admit Date: 7/10/2019    Discharge Needs Assessment     Row Name 07/11/19 1432       Living Environment    Lives With  child(mariana), dependent;parent(s)    Current Living Arrangements  home/apartment/condo    Primary Care Provided by  self;child(mariana)    Provides Primary Care For  no one    Family Caregiver if Needed  child(mariana), adult    Quality of Family Relationships  unable to assess       Resource/Environmental Concerns    Resource/Environmental Concerns  none    Transportation Concerns  car, none       Transition Planning    Patient/Family Anticipates Transition to  home with family    Patient/Family Anticipated Services at Transition  none    Transportation Anticipated  family or friend will provide       Discharge Needs Assessment    Concerns to be Addressed  no discharge needs identified    Equipment Currently Used at Home  none    Equipment Needed After Discharge  none        Discharge Plan     Row Name 07/11/19 1432       Plan    Plan  Home with oldest daughter     Plan Comments  CCP spoke to pt at bedside to discuss discharge plan.  CCP role explained Face sheet verified.  Pt emergency contact is her mother Jo Ann [phone]..  Pt PCP is Dr Corey Montiel.  Pt obtains her medications from [facility] pharmacy in [facility].  She lives in a house with her mother and two dependent children.  She has no history of HH or rehab  Plan is home with her oldest daughter  No needs           Destination      No service coordination in this encounter.      Durable Medical Equipment      No service coordination in this encounter.      Dialysis/Infusion      No service coordination in this encounter.      Home Medical Care      No service coordination in this encounter.      Therapy      No service coordination in this encounter.      Community Resources      No service coordination in this encounter.         Expected Discharge Date and Time     Expected Discharge Date Expected Discharge Time    Jul 11, 2019         Demographic Summary    No documentation.       Functional Status    No documentation.       Psychosocial    No documentation.       Abuse/Neglect    No documentation.       Legal    No documentation.       Substance Abuse    No documentation.       Patient Forms    No documentation.           Evangelina Long RN

## 2019-07-11 NOTE — PROGRESS NOTES
Clinical Pharmacy Services: Medication History    Marie Bernabe is a 39 y.o. 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    Allergies as of 06/11/2019   • (No Known Allergies)       Medication information was obtained from: [facility]  Pharmacy and Phone Number: [phone]    Prior to Admission Medications     Prescriptions Last Dose Informant Patient Reported? Taking?    aspirin 81 MG chewable tablet 7/10/2019 Self Yes Yes    Chew 81 mg Daily.    hydrochlorothiazide (HYDRODIURIL) 25 MG tablet 7/10/2019 Self Yes Yes    Take 25 mg by mouth Daily.    clopidogrel (PLAVIX) 75 MG tablet 7/10/2019  No Yes    Take 1 tablet by mouth Daily.            Medication notes: No changes    This medication list is complete to the best of my knowledge as of 7/11/2019    Please call if questions.      7/11/2019 3:15 PM

## 2019-07-16 LAB
ACT BLD: 125 SECONDS (ref 82–152)
ACT BLD: 252 SECONDS (ref 82–152)
BASE EXCESS BLDA CALC-SCNC: 10 MMOL/L (ref -5–5)
BASE EXCESS BLDA CALC-SCNC: 3 MMOL/L (ref -5–5)
BASE EXCESS BLDA CALC-SCNC: 4 MMOL/L (ref -5–5)
CO2 BLDA-SCNC: 29 MMOL/L (ref 24–29)
CO2 BLDA-SCNC: 29 MMOL/L (ref 24–29)
CO2 BLDA-SCNC: 36 MMOL/L (ref 24–29)
GLUCOSE BLDC GLUCOMTR-MCNC: 100 MG/DL (ref 70–130)
GLUCOSE BLDC GLUCOMTR-MCNC: 112 MG/DL (ref 70–130)
GLUCOSE BLDC GLUCOMTR-MCNC: 99 MG/DL (ref 70–130)
HCO3 BLDA-SCNC: 27.3 MMOL/L (ref 22–26)
HCO3 BLDA-SCNC: 28 MMOL/L (ref 22–26)
HCO3 BLDA-SCNC: 34.1 MMOL/L (ref 22–26)
HCT VFR BLDA CALC: 31 % (ref 38–51)
HCT VFR BLDA CALC: 32 % (ref 38–51)
HCT VFR BLDA CALC: 36 % (ref 38–51)
HGB BLDA-MCNC: 10.5 G/DL (ref 12–17)
HGB BLDA-MCNC: 10.9 G/DL (ref 12–17)
HGB BLDA-MCNC: 12.2 G/DL (ref 12–17)
PCO2 BLDA: 39 MM HG (ref 35–45)
PCO2 BLDA: 40.5 MM HG (ref 35–45)
PCO2 BLDA: 53 MM HG (ref 35–45)
PH BLDA: 7.42 PH UNITS (ref 7.35–7.6)
PH BLDA: 7.44 PH UNITS (ref 7.35–7.6)
PH BLDA: 7.46 PH UNITS (ref 7.35–7.6)
PO2 BLDA: 123 MMHG (ref 80–105)
PO2 BLDA: 144 MMHG (ref 80–105)
PO2 BLDA: 289 MMHG (ref 80–105)
POTASSIUM BLDA-SCNC: 2.7 MMOL/L (ref 3.5–4.9)
POTASSIUM BLDA-SCNC: 3.4 MMOL/L (ref 3.5–4.9)
POTASSIUM BLDA-SCNC: 4.1 MMOL/L (ref 3.5–4.9)
SAO2 % BLDA: 100 % (ref 95–98)
SAO2 % BLDA: 99 % (ref 95–98)
SAO2 % BLDA: 99 % (ref 95–98)

## 2019-07-24 ENCOUNTER — OFFICE VISIT (OUTPATIENT)
Dept: NEUROSURGERY | Facility: CLINIC | Age: 39
End: 2019-07-24

## 2019-07-24 VITALS
DIASTOLIC BLOOD PRESSURE: 88 MMHG | RESPIRATION RATE: 18 BRPM | HEART RATE: 98 BPM | WEIGHT: 204 LBS | HEIGHT: 63 IN | SYSTOLIC BLOOD PRESSURE: 126 MMHG | BODY MASS INDEX: 36.14 KG/M2

## 2019-07-24 DIAGNOSIS — R90.89 ABNORMAL BRAIN MRI: Primary | ICD-10-CM

## 2019-07-24 DIAGNOSIS — G93.2 PSEUDOTUMOR CEREBRI: ICD-10-CM

## 2019-07-24 DIAGNOSIS — G08 TRANSVERSE SINUS THROMBOSIS: ICD-10-CM

## 2019-07-24 PROCEDURE — 99214 OFFICE O/P EST MOD 30 MIN: CPT | Performed by: NURSE PRACTITIONER

## 2019-07-24 RX ORDER — CLOPIDOGREL BISULFATE 75 MG/1
150 TABLET ORAL DAILY
Qty: 60 TABLET | Refills: 6 | Status: SHIPPED | OUTPATIENT
Start: 2019-07-24 | End: 2020-06-01

## 2019-07-24 NOTE — PROGRESS NOTES
" HPI:   Marie Bernabe is a 39 y.o. female returns to the office today for follow-up status post cerebral angiogram with venography and stent placement on July 10 with Dr. Groves.    The patient has a history of headaches and visual changes papilledema secondary to pseudotumor cerebri.  She underwent lumbar puncture for cerebrospinal fluid pressure evaluation with Dr. Groves on May 30.  Opening pressures were found to be 43 and closing pressure was 15 after removing 50 mL's of CSF.  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  Following stent placement on July 10 she had normalization of her intracranial pressures.  She was started on Plavix and low-dose aspirin for the stent placement.    She is very happy to report complete resolution of headaches.  She also denies any visual changes, including double and blurred vision.  She states that she is feeling well and has no issues with the right groin site following the angiogram.  She also denies any dizziness or lightheadedness, particularly when bending.  She is compliant with medications.    She presents unaccompanied.    /88 (BP Location: Right arm, Patient Position: Sitting)   Pulse 98   Resp 18   Ht 160 cm (63\")   Wt 92.5 kg (204 lb)   LMP 07/03/2019   BMI 36.14 kg/m²       Review of Systems   Eyes: Negative for visual disturbance.   Cardiovascular: Negative for leg swelling.   Skin: Negative for color change (or coolness of procedure leg), pallor and wound (swelling, excess bruising or bleeding of groin site ).   Neurological: Negative for facial asymmetry, speech difficulty, weakness, numbness (of procedure leg) and headaches.        Physical Exam   Constitutional: She is oriented to person, place, and time. She appears well-developed and well-nourished. She is cooperative.  Non-toxic appearance. She does not have a sickly appearance. She does not appear ill.   Very pleasant well-appearing overweight female "   HENT:   Head: Normocephalic and atraumatic.   Eyes: EOM are normal. Pupils are equal, round, and reactive to light.   Neck: No tracheal deviation present.   Pulmonary/Chest: Effort normal.   Abdominal: Soft.   Musculoskeletal: Normal range of motion. She exhibits no tenderness or deformity.   Moving all extremities well   Neurological: She is alert and oriented to person, place, and time. She has normal strength. She displays no tremor. No cranial nerve deficit or sensory deficit. She exhibits normal muscle tone. Coordination and gait normal. GCS eye subscore is 4. GCS verbal subscore is 5. GCS motor subscore is 6.   Gait is stable and upright, able to heel and toe walk, able to tandem  Cranial nerves II through XII grossly intact   Skin: Skin is warm and dry.   Psychiatric: She has a normal mood and affect. Her behavior is normal. Thought content normal.   Vitals reviewed.    Neurologic Exam     Mental Status   Oriented to person, place, and time.     Cranial Nerves     CN III, IV, VI   Pupils are equal, round, and reactive to light.  Extraocular motions are normal.     Motor Exam     Strength   Strength 5/5 throughout.       Findings/Results:  No new imaging    Assessment/Plan:  Marie was seen today for post-op.    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        Discussion/Summary  She returns the office today for first hospital follow-up status post stent placement for left transverse sinus hypoplasia and right transverse sinus stenosis resulting in pseudotumor cerebri.  Exam as noted above, no red flags.  She is doing very well following a stent placement and is tolerating the Plavix and aspirin well.  She is taking the medications as directed.  She reports complete resolution of headaches, visual changes  "and increased pressure with bending.  She is very pleased with her postoperative status.  She has resumed life is normal without any issues.  I told her that I am not sure what follow-up will be regarding continued surveillance of the stent placement.  She will likely need either follow-up cerebral angiogram or repeat CTA head imaging.  I will discuss with Dr. Groves and the patient will be contacted.  She has no restrictions from our standpoint.  She is on Plavix 150 mg twice daily secondary to subtherapeutic P2 Y 12 levels.    Plan: Patient be contacted once follow-up has been discussed with Dr. Groves.    Plan: No Follow-up on file.         Patient Care Team    Patient Care Team:  Corey Montiel MD as PCP - General (Family Medicine)    Susan Martin, APRN  7/24/2019    \"Dictated utilizing Dragon dictation\".    "

## 2019-07-27 NOTE — OP NOTE
EMBOLIZATION CEREBRAL  Procedure Note  Cerebral Angiogram    Marie Bernabe  7/10/2019  [id number]    SURGEON  Maico Groves MD    ASSISTANT:  ROBINSON Nathan MD  INDICATION:  Patient has cerebri with extremely elevated intracranial pressures and papilledema.  It is indicated to attempt to reduce the intracranial pressure with relief of a previously identified venous outlet stenosis.    Pre-op Diagnosis:   Transverse sinus stenosis  Pseudotumor cerebri [G93.2]    Post-Op Diagnosis Codes:     * Transverse sinus stenosis     * Pseudotumor cerebri [G93.2]    PROCEDURE PERFORMED:  Procedure(s):  Cerebral arteriography and venography with placement of a transverse sinus stent and angioplasty.    1. Selective catheterization and right internal carotid angiography  2. Selective catheterization and right sagittal sinus venography  3. Measurement of intra venous sinus intracranial pressure  4. Placement of right transverse sigmoid sinus venous stent  5. Post stent placement sagittal sinus venography  6.  Post stent placement right internal carotid artery angiography    Anesthesia: General    Staff:   Circulator: Tapan Bennett RN  Radiology Technologist: Lamont Garcia  Scrub Person: Demetrius Butler  Assistant: Daya White CSA  Vascular Radiology Technician: Reji Sams    Estimated Blood Loss: 100ml    Specimens:   Order Name Source Comment Collection Info Order Time   P2Y12 PLATELET INHIBITION   Collected By: Roseann Leal RN 7/10/2019  7:43 AM   P2Y12 PLATELET INHIBITION Arm, Left   30min after po plavix Collected By: Roseann Leal RN 7/10/2019  9:04 AM               Drains:   none                                     Findings: Right transverse sinus stenosis which was alleviated after stent placement.    Complications: None    PROCEDURE IN DETAIL: The patient was brought to the operating room where she was hooked up to EKG, oxygen saturation, and blood  pressure monitoring and placed on the biplane angiography table. Continuous monitoring was accomplished during the operative intervention. She was given intravenous sedation and general anesthesia and the groin was prepped in the usual sterile manner.     Local anesthesia was given.  Singlewall puncture technique using a micropuncture kit under ultrasound guidance placed a 5 Danish introducing sheath into the right common femoral artery and a 6 Danish introducing sheath into the right common femoral vein..  I used a 5 Danish James catheter.    Under fluoroscopic guidance selective catheterization of the right internal carotid artery and the right internal  cerebral angiography in multiple projections demonstrates the normal branching pattern of the internal carotid artery bilaterally. There is no evidence of aneurysmal formation, arterio-venous malformation, arterio-venous fistula.  There is evidence of a moderately severe stenosis of the right transverse sigmoid sinus junction.  There is also evidence of a atresia/hypoplasia of the left transverse sinus.    Selective catheterization of the right jugular bulb with the James catheter and then exchanged the James catheter for a long shuttle 90 cm sheath.  I then used a NanoVM catheter and a SL 10 with a micro wire and I selectively catheterized the sagittal sinus.  I then advanced over the Nevi and catheter and the microcatheter the long shuttle sheath into the proximal portion of the sigmoid sinus.    Sagittal sinus venography then confirmed the hypoplasia of the left transverse sinus and severe stenosis of the right transverse sigmoid junction proximal to the vein of Saeed.    I measured the pressure in the sagittal sinus and it was 40 cm of water.  I measured the pressure in the jugular vein and it was 13 cm of water.    Then chose a 10 x 24 carotid Wallstent and over an exchange long Synchro wire I placed the Wallstent and on sheath across the area of  stenosis.    I then reselected the right internal carotid artery and performed arteriography of the right internal carotid artery multiple projections but now demonstrated resolution of the stenosis.    Sagittal sinus venography now demonstrated resolution of the right transverse sigmoid sinus junction stenosis.    It should be noted that interpretation and supervision of the arteriography and venography was performed by the .    I then measured the pressure in the sagittal sinus and it was now 13 cm of water and in fact the pressure remained 13 cm of water in the in the torcula, transverse sinus, sigmoid sinus, jugular vein.  This demonstrated complete resolution of intracranial hypertension.    Arteriography through the existing sheath demonstrates the sheath to be within the common femoral artery.The long sheath was removed as was the 5 Montserratian sheath and I used a minx system.  Pressure was held for the appropriate length of time.  The patient was then transferred to recovery in stable condition.    Should be noted that P2 Y 12 was noted to be therapeutic prior to the placement of the stent.  The patient was also heparinized to 2 times normal based on the ACT and which was reversed at the end of the procedure prior to removal of the arterial and venous sheaths.     The patient was transferred to the recovery room in stable and good condition.      Maico Groves MD     Date: 7/27/2019  Time: 11:19 AM

## 2019-11-25 ENCOUNTER — TELEPHONE (OUTPATIENT)
Dept: NEUROSURGERY | Facility: CLINIC | Age: 39
End: 2019-11-25

## 2019-11-25 NOTE — TELEPHONE ENCOUNTER
Alisha,  The patient is on potassium due to her diuretic HydroDIURIL.  She needs to ask her family doctor whether she needs to remain on the potassium.  Please advise her of this.    As far as the Plavix, I have forward this message to Susan since she saw the patient last.  She is trying to recollect whether she discussed it with Dr. Groves.  She will follow-up with you after she has reviewed the patient's information.

## 2019-11-26 NOTE — TELEPHONE ENCOUNTER
Let's keep her on the Plavix for now.  I will discuss her situation with Dr. Pillai next Wednesday when he is in the office as I cannot recall what the plan was for this patient, per Dr Groves

## 2019-12-04 ENCOUNTER — MEDICATION THERAPY MANAGEMENT (OUTPATIENT)
Dept: NEUROSURGERY | Facility: CLINIC | Age: 39
End: 2019-12-04

## 2020-05-08 ENCOUNTER — TELEPHONE (OUTPATIENT)
Dept: NEUROSURGERY | Facility: CLINIC | Age: 40
End: 2020-05-08

## 2020-05-08 NOTE — TELEPHONE ENCOUNTER
VALENTE FROM PATIENT'S PCP CALLED TO CHECK STATUS OF REFERRAL FAXED IN OFFICE ON 5/4. NO PREV. ENTRY NOR RECORD FROM REFERRING OFFICE IN CHART AT TIME OF CALL.ENTRY CREATED. PLEASE CHECK COMMUNICATIONS & ADVISE.

## 2020-06-01 ENCOUNTER — OFFICE VISIT (OUTPATIENT)
Dept: NEUROSURGERY | Facility: CLINIC | Age: 40
End: 2020-06-01

## 2020-06-01 VITALS
HEART RATE: 74 BPM | HEIGHT: 63 IN | TEMPERATURE: 97.3 F | DIASTOLIC BLOOD PRESSURE: 97 MMHG | WEIGHT: 204 LBS | SYSTOLIC BLOOD PRESSURE: 157 MMHG | BODY MASS INDEX: 36.14 KG/M2

## 2020-06-01 DIAGNOSIS — H53.8 BLURRED VISION: ICD-10-CM

## 2020-06-01 DIAGNOSIS — G93.2 PSEUDOTUMOR CEREBRI: ICD-10-CM

## 2020-06-01 DIAGNOSIS — R51.9 RIGHT-SIDED HEADACHE: Primary | ICD-10-CM

## 2020-06-01 DIAGNOSIS — G08 TRANSVERSE SINUS THROMBOSIS: ICD-10-CM

## 2020-06-01 PROCEDURE — 99213 OFFICE O/P EST LOW 20 MIN: CPT | Performed by: NURSE PRACTITIONER

## 2020-06-01 RX ORDER — METOPROLOL SUCCINATE 25 MG/1
1 TABLET, EXTENDED RELEASE ORAL DAILY
COMMUNITY
Start: 2020-05-15

## 2020-06-08 ENCOUNTER — HOSPITAL ENCOUNTER (OUTPATIENT)
Dept: CT IMAGING | Facility: HOSPITAL | Age: 40
Discharge: HOME | End: 2020-06-08
Admitting: NURSE PRACTITIONER

## 2020-06-08 DIAGNOSIS — G08 TRANSVERSE SINUS THROMBOSIS: ICD-10-CM

## 2020-06-08 DIAGNOSIS — R51.9 RIGHT-SIDED HEADACHE: ICD-10-CM

## 2020-06-08 LAB — CREAT BLDA-MCNC: 0.7 MG/DL (ref 0.6–1.3)

## 2020-06-08 PROCEDURE — 25010000002 IOPAMIDOL 61 % SOLUTION: Performed by: NURSE PRACTITIONER

## 2020-06-08 PROCEDURE — 82565 ASSAY OF CREATININE: CPT

## 2020-06-08 PROCEDURE — 70496 CT ANGIOGRAPHY HEAD: CPT

## 2020-06-08 RX ADMIN — IOPAMIDOL 95 ML: 612 INJECTION, SOLUTION INTRAVENOUS at 16:47

## 2020-06-22 ENCOUNTER — TELEPHONE (OUTPATIENT)
Dept: NEUROSURGERY | Facility: CLINIC | Age: 40
End: 2020-06-22

## 2020-06-22 NOTE — TELEPHONE ENCOUNTER
cristhian Sanchez last office note she needs to follow up with Dr Pennington, I am unsure if she has an appt or not

## 2020-06-22 NOTE — TELEPHONE ENCOUNTER
PATIENT RECENTLY COMPLETED CTA ON 06/08/20 & IS READY TO REVIEW RESULT WITH PROVIDE TO MOVE FORWARD.    PLEASE CONTACT PATIENT -[phone] FOR FURTHER ASSISTANCE

## 2020-06-24 ENCOUNTER — DOCUMENTATION (OUTPATIENT)
Dept: NEUROSURGERY | Facility: CLINIC | Age: 40
End: 2020-06-24

## 2020-06-24 NOTE — PROGRESS NOTES
"   NEUROINTERVENTIONAL FOLLOW UP VISIT    Marie Bernabe  1980  [id number]      Patient Care Team:  Corey Montiel MD as PCP - General (Family Medicine)    Chief complaint: Headaches/ Venous Sinus Stent    Subjective .     History of present illness: 40 y.o. female is here today for follow-up at the request of Dr. Corey Montiel for Benign Intracranial Hypertension/pseduotumor cerebri and right transverse sinus stenosis. She was last in the office 6-1-2020 for follow-up status post cerebral angiogram with venography and stent placement on July 10, 2019 with Dr. Groves. F/U CTV performed on 6-8-2020. Patient now for review of images. She reports relief of HA and visual issues following surgery last year. Recently, Ms. Bernabe c/o frequent pressure on top right side of head (6-7/10 at worst) and pressure in right eye for about the last month. She takes ibuprofen with relief. No associated double vision, but some focus issues- straining eyes. No floaters like she had previously. She reports occasional headaches, dizziness and lightheadedness. She has issues with her balance/gait, states she walks into things. She has some trouble reading small print, focusing on things and blurred vision. She is having issues with HTN- she has recently been placed on 2 BP meds and she is having weekly BP checks with PCP. She is still running high-  Mainly the \"bottom number\". She was on DAPT following surgery, but she has been on ASA 81 mg daily since 12/4/2019. CTV was requested to assess sinus stent patency.    Review of Systems  Constitutional: Negative for activity change and fever.   HENT: Negative for tinnitus.    Eyes: Positive for pain (right eye) and visual disturbance.   Musculoskeletal: Positive for gait problem.   Neurological: Positive for dizziness, light-headedness and headaches. Negative for facial asymmetry and weakness.   Psychiatric/Behavioral: Negative for confusion    History  Medical History        Past " Medical History:   Diagnosis Date   • Anxiety     • Blurred vision     • Depression     • Pseudotumor cerebri     • Spinal headache       AFTER SPINAL TAP.            Surgical History   Past Surgical History:   Procedure Laterality Date   • EMBOLIZATION CEREBRAL Left 3/14/2016     Procedure: CEREBRAL ANGIOGRAM W/ LT LUMBAR PUNCTURE;  Surgeon: Maico Groves MD;  Location: [facility] 18/19;  Service:    • EMBOLIZATION CEREBRAL Right 5/25/2016     Procedure: CEREBRAL ANGIOGRAM AND VENOGRAM ;  Surgeon: Maico Groves MD;  Location: [facility] 18/19;  Service:    • LUMBAR PUNCTURE   03/14/2016     Dr. 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   Tobacco Use   • Smoking status: Never Smoker   • Smokeless tobacco: Never Used   Substance and Sexual Activity   • Alcohol use: No   • Drug use: No   • Sexual activity: Defer                  Family History   Problem Relation Age of Onset   • Cancer Maternal Grandmother           breast/passed   • Heart disease Maternal Grandfather     • Heart disease Paternal Grandfather           No Known Allergies         Objective     Vital Signs    BP: 158/98  P: 100    Physical Exam:     General Appearance:    Alert, cooperative, in no acute distress   Head:    Normocephalic, without obvious abnormality, atraumatic   Eyes:            Lids and lashes normal, conjunctivae and sclerae normal, no   icterus, no pallor, corneas clear, PERRLA   Ears:    Ears appear intact with no abnormalities noted   Throat:   No oral lesions, no thrush, oral mucosa moist   Neck:   No adenopathy, supple, trachea midline, no thyromegaly, no   carotid bruit,  no JVD   Back:     No kyphosis present, no scoliosis present, no skin lesions,      erythema or scars, no tenderness to percussion or                   palpation,   non tender to palpation of bony midline, normal painless ROM   Lungs:     respirations regular, even and unlabored    Heart:    Regular rhythm and normal rate   Chest Wall:    No abnormalities observed   Abdomen:     No masses, no organomegaly, soft non-tender, non-distended, no guarding, no rebound tenderness   Rectal:     Deferred   Extremities:   Moves all extremities well, no edema, no cyanosis, no             redness   Pulses:   Pulses palpable and equal bilaterally   Skin:   No bleeding, bruising or rash   Lymph nodes:   No palpable adenopathy   Neurologic: Mental status is awake and alert, oriented to person place and time  Recall is 3/3 immediate and 3/3 at five minutes  Follows 3 step commands  CN: pupils reactive, EOMI, VF full to confrontation, unable to visualize fundi, v1-3 intact, Face symmetric, Hearing intact to finger rub, Palate raises Sym, Gag deferred, Shrug intact, Tongue protrudes in midline  Str: 5/5 deltoid/biceps/triceps/wrist extensors/iliopsoas/ quadriceps/hamstrings/dorsiflexors/extensor hallucis longus/plantarflexors  Sensory: intact to light touch/pinprick/proprioception in all extremities  Gait: Normal         Results Review:  CT venogram of the head performed on 6/8/2020 was reviewed with the patient and demonstrates a widely patent right transverse sinus stent with no evidence of in-stent stenosis or development of a new sinus occlusion or narrowing.    Lab Results (last 24 hours)     ** No results found for the last 24 hours. **          Assessment/Plan     40-year-old female with history of intracranial hypertension and pseudotumor cerebri now status post right transverse sinus stent placement for a stenosis on 7/10/2019.  Patient has done well immediately after the procedure.  Patient has recently developed some  headaches (6-7/10) and evaluation for in-stent stenosis or reocclusion was performed.  The patient CTV shows a widely patent stent and no new occlusion.  The patient is to see ophthalmology for evaluation for any papilledema.  Patient's primary care physician believes that hypertension may be related to her recent headaches and is working on medical management.  Continued follow-up with him is recommended.  I see no need for cerebral angiography at this time, but monitoring of the patient's headaches will continue.    I discussed the patients findings and my recommendations with patient    Robson Pennington MD  06/24/20  12:49    Time: 30 minutes

## 2021-03-12 ENCOUNTER — APPOINTMENT (OUTPATIENT)
Dept: SLEEP MEDICINE | Facility: HOSPITAL | Age: 41
End: 2021-03-12

## 2021-04-16 ENCOUNTER — APPOINTMENT (OUTPATIENT)
Dept: SLEEP MEDICINE | Facility: HOSPITAL | Age: 41
End: 2021-04-16

## 2021-05-11 ENCOUNTER — APPOINTMENT (OUTPATIENT)
Dept: SLEEP MEDICINE | Facility: HOSPITAL | Age: 41
End: 2021-05-11

## 2021-06-15 ENCOUNTER — OFFICE VISIT (OUTPATIENT)
Dept: SLEEP MEDICINE | Facility: HOSPITAL | Age: 41
End: 2021-06-15

## 2021-06-15 VITALS
HEIGHT: 63 IN | SYSTOLIC BLOOD PRESSURE: 157 MMHG | DIASTOLIC BLOOD PRESSURE: 99 MMHG | WEIGHT: 207 LBS | BODY MASS INDEX: 36.68 KG/M2 | HEART RATE: 89 BPM

## 2021-06-15 DIAGNOSIS — R51.9 RIGHT-SIDED HEADACHE: ICD-10-CM

## 2021-06-15 DIAGNOSIS — G47.33 OBSTRUCTIVE SLEEP APNEA, ADULT: Primary | ICD-10-CM

## 2021-06-15 DIAGNOSIS — I10 ESSENTIAL HYPERTENSION: ICD-10-CM

## 2021-06-15 PROCEDURE — G0463 HOSPITAL OUTPT CLINIC VISIT: HCPCS

## 2021-06-15 NOTE — PROGRESS NOTES
PULMONARY  CONSULT NOTE      PATIENT IDENTIFICATION:  Name: Marie Bernabe  Age: 41 y.o.  Sex: female  :  1980  MRN: [id number]    DATE OF CONSULTATION:  6/15/2021                     CHIEF COMPLAINT: VIKA    History of Present Illness:   Marie Bernabe is a 41 y.o. female Pt with still multiple wakening up at night with sleepiness fatigue and snoring, witnessed apnea, Hard  to get up in the morning. Daytime fatigue sleepiness loss of energy, Midway score of ( 13)       Review of Systems:   Constitutional:  As above   Eyes: negative   ENT/oropharynx: negative   Cardiovascular: negative   Respiratory: negative   Gastrointestinal: negative   Genitourinary: negative   Neurological: negative   Musculoskeletal: negative   Integument/breast: negative   Endocrine: negative   Allergic/Immunologic: negative     Past Medical History:  Past Medical History:   Diagnosis Date   • Anxiety    • Benign intracranial hypertension    • Blurred vision    • Depression    • Hydrocephalus (CMS/HCC)    • Hypertension    • Pseudotumor cerebri    • Spinal headache     AFTER SPINAL TAP.       Past Surgical History:  Past Surgical History:   Procedure Laterality Date   • EMBOLIZATION CEREBRAL Left 3/14/2016    Procedure: CEREBRAL ANGIOGRAM W/ LT LUMBAR PUNCTURE;  Surgeon: Maico Groves MD;  Location: [facility] ;  Service:    • EMBOLIZATION CEREBRAL Right 2016    Procedure: CEREBRAL ANGIOGRAM AND VENOGRAM ;  Surgeon: Maico Groves MD;  Location: [facility] ;  Service:    • EMBOLIZATION CEREBRAL N/A 7/10/2019    Procedure: Cerebral arteriography and venography with placement of a transverse sinus stent and angioplasty.;  Surgeon: Macio Groves MD;  Location: [facility] ;  Service: Neurosurgery   • LUMBAR PUNCTURE  2016    Dr. Groves   • TUBAL ABDOMINAL LIGATION          Family History:  Family History   Problem Relation Age of Onset   • Cancer Maternal Grandmother          breast/passed   • Heart disease Maternal Grandfather    • Heart disease Paternal Grandfather    • Hypertension Mother    • No Known Problems Father    • Malig Hyperthermia Neg Hx         Social History:   Social History     Tobacco Use   • Smoking status: Never Smoker   • Smokeless tobacco: Never Used   Substance Use Topics   • Alcohol use: No        Allergies:  No Known Allergies    Home Meds:  (Not in a hospital admission)      Objective:    Vitals Ranges:   Heart Rate:  [89] 89  BP: (157)/(99) 157/99  Body mass index is 36.67 kg/m².     Exam:  General Appearance:  WDWN    HEENT:   without obvious abnormality,  Conjunctiva/corneas clear,  Normal external ear canals, no drainage    Clear orsalmucosa,  Mallampati score 3    Neck:  Supple, symmetrical, trachea midline. No JVD.  Lungs:   Bilateral basal rhonchi bilaterally, respirations unlabored symmetrical wall movement.    Chest wall:  No tenderness or deformity.    Heart:  Regular rate and rhythm, S1 and S2 normal.  Extremities: Trace edema no clubbing or Cyanosis        Data Review:  All labs (24hrs): No results found for this or any previous visit (from the past 24 hour(s)).     Imaging:  CT Angiogram Head  Narrative: CT VENOGRAM WITH CONTRAST     CLINICAL HISTORY: Right transverse sinus stent.     CT venogram of the head was performed with 1 mm axial images. Sagittal,  coronal, and 3-dimensional reconstructed images were obtained.  Additionally, there are 3 mm axial images before and after the  administration of IV contrast.     COMPARISON: Comparison is made to previous MR venogram dated 02/03/2016.     FINDINGS: The patient has undergone a stenting procedure of a right  transverse sinus stenosis. A stent is appreciated within the right  transverse sinus and the stent is widely patent. The left transverse and  sigmoid sinus are hypoplastic. The superior sagittal sinus, straight  sinus, and internal jugular veins are all patent. The ventricles, sulci,  and  cisterns are age appropriate. The gray-white matter differentiation  is within normal limits. The basal ganglia and thalami are unremarkable.  No abnormal areas of parenchymal contrast enhancement are seen.     Incidental note is made of a mucous retention cyst within the left  maxillary sinus.     Impression:    Status post stenting of a right transverse sinus stenosis. The stent is  widely patent and no residual stenosis is seen. Otherwise, the CT  venogram is unremarkable.           Radiation dose reduction techniques were utilized, including automated  exposure control and exposure modulation based on body size.     This report was finalized on 6/9/2020 12:53 PM by Dr. Fernandez Spaulding M.D.          ASSESSMENT:  Diagnoses and all orders for this visit:    Obstructive sleep apnea, adult    Right-sided headache        PLAN:   This is patient with symptoms of obstructive sleep apnea, NPSG study ASAP / split night study, Avoid supine avoid sedative meds in pm, weight loss, Avoid driving. Long discussion with patient about the physiology of VIKA, and long term and short term   benefit of treating VIKA      Treating VIKA will improve BP control    Follow-up after sleep study    Dana Martínez MD. D, ABSM.  6/15/2021  13:14 EDT

## 2021-06-24 ENCOUNTER — TRANSCRIBE ORDERS (OUTPATIENT)
Dept: OBSTETRICS AND GYNECOLOGY | Facility: CLINIC | Age: 41
End: 2021-06-24

## 2021-06-24 DIAGNOSIS — Z01.818 OTHER SPECIFIED PRE-OPERATIVE EXAMINATION: Primary | ICD-10-CM

## 2021-07-03 ENCOUNTER — LAB (OUTPATIENT)
Dept: LAB | Facility: HOSPITAL | Age: 41
End: 2021-07-03

## 2021-07-03 DIAGNOSIS — Z01.818 OTHER SPECIFIED PRE-OPERATIVE EXAMINATION: ICD-10-CM

## 2021-07-03 LAB — SARS-COV-2 RNA PNL SPEC NAA+PROBE: NOT DETECTED

## 2021-07-03 PROCEDURE — 87635 SARS-COV-2 COVID-19 AMP PRB: CPT | Performed by: OBSTETRICS & GYNECOLOGY

## 2021-07-03 PROCEDURE — C9803 HOPD COVID-19 SPEC COLLECT: HCPCS

## 2021-07-06 ENCOUNTER — HOSPITAL ENCOUNTER (OUTPATIENT)
Dept: SLEEP MEDICINE | Facility: HOSPITAL | Age: 41
Discharge: HOME | End: 2021-07-06
Admitting: INTERNAL MEDICINE

## 2021-07-06 DIAGNOSIS — G47.33 OBSTRUCTIVE SLEEP APNEA, ADULT: ICD-10-CM

## 2021-07-06 PROCEDURE — 95811 POLYSOM 6/>YRS CPAP 4/> PARM: CPT

## 2022-12-06 ENCOUNTER — TRANSCRIBE ORDERS (OUTPATIENT)
Dept: MRI IMAGING | Facility: HOSPITAL | Age: 42
End: 2022-12-06

## 2022-12-07 ENCOUNTER — TRANSCRIBE ORDERS (OUTPATIENT)
Dept: ADMINISTRATIVE | Facility: HOSPITAL | Age: 42
End: 2022-12-07

## 2022-12-07 DIAGNOSIS — G93.2 BENIGN INTRACRANIAL HYPERTENSION: Primary | ICD-10-CM

## 2023-01-04 ENCOUNTER — HOSPITAL ENCOUNTER (OUTPATIENT)
Dept: MRI IMAGING | Facility: HOSPITAL | Age: 43
Discharge: HOME | End: 2023-01-04
Admitting: FAMILY MEDICINE
Payer: COMMERCIAL

## 2023-01-04 PROCEDURE — 0 GADOBENATE DIMEGLUMINE 529 MG/ML SOLUTION: Performed by: FAMILY MEDICINE

## 2023-01-04 PROCEDURE — A9577 INJ MULTIHANCE: HCPCS | Performed by: FAMILY MEDICINE

## 2023-01-04 PROCEDURE — 70553 MRI BRAIN STEM W/O & W/DYE: CPT

## 2023-01-04 RX ADMIN — GADOBENATE DIMEGLUMINE 17 ML: 529 INJECTION, SOLUTION INTRAVENOUS at 11:47

## 2023-05-27 ENCOUNTER — HOSPITAL ENCOUNTER (EMERGENCY)
Facility: HOSPITAL | Age: 43
Discharge: HOME | End: 2023-05-27
Attending: EMERGENCY MEDICINE
Payer: COMMERCIAL

## 2023-05-27 VITALS
RESPIRATION RATE: 16 BRPM | BODY MASS INDEX: 34.55 KG/M2 | HEIGHT: 63 IN | WEIGHT: 195 LBS | SYSTOLIC BLOOD PRESSURE: 157 MMHG | OXYGEN SATURATION: 96 % | TEMPERATURE: 98.3 F | HEART RATE: 60 BPM | DIASTOLIC BLOOD PRESSURE: 100 MMHG

## 2023-05-27 DIAGNOSIS — Z91.199 HISTORY OF NONCOMPLIANCE WITH MEDICAL TREATMENT: ICD-10-CM

## 2023-05-27 DIAGNOSIS — R42 DIZZINESS: ICD-10-CM

## 2023-05-27 DIAGNOSIS — I10 ACCELERATED HYPERTENSION: Primary | ICD-10-CM

## 2023-05-27 LAB
ALBUMIN SERPL-MCNC: 4.2 G/DL (ref 3.5–5.2)
ALBUMIN/GLOB SERPL: 1.4 G/DL
ALP SERPL-CCNC: 81 U/L (ref 39–117)
ALT SERPL W P-5'-P-CCNC: 16 U/L (ref 1–33)
ANION GAP SERPL CALCULATED.3IONS-SCNC: 5.9 MMOL/L (ref 5–15)
AST SERPL-CCNC: 16 U/L (ref 1–32)
BASOPHILS # BLD AUTO: 0.02 10*3/MM3 (ref 0–0.2)
BASOPHILS NFR BLD AUTO: 0.4 % (ref 0–1.5)
BILIRUB SERPL-MCNC: 0.3 MG/DL (ref 0–1.2)
BUN SERPL-MCNC: 6 MG/DL (ref 6–20)
BUN/CREAT SERPL: 9.8 (ref 7–25)
CALCIUM SPEC-SCNC: 8.8 MG/DL (ref 8.6–10.5)
CHLORIDE SERPL-SCNC: 100 MMOL/L (ref 98–107)
CO2 SERPL-SCNC: 32.1 MMOL/L (ref 22–29)
CREAT SERPL-MCNC: 0.61 MG/DL (ref 0.57–1)
DEPRECATED RDW RBC AUTO: 38.3 FL (ref 37–54)
EGFRCR SERPLBLD CKD-EPI 2021: 113.9 ML/MIN/1.73
EOSINOPHIL # BLD AUTO: 0.04 10*3/MM3 (ref 0–0.4)
EOSINOPHIL NFR BLD AUTO: 0.8 % (ref 0.3–6.2)
ERYTHROCYTE [DISTWIDTH] IN BLOOD BY AUTOMATED COUNT: 11.9 % (ref 12.3–15.4)
GLOBULIN UR ELPH-MCNC: 3 GM/DL
GLUCOSE SERPL-MCNC: 86 MG/DL (ref 65–99)
HCT VFR BLD AUTO: 40.1 % (ref 34–46.6)
HGB BLD-MCNC: 13.4 G/DL (ref 12–15.9)
IMM GRANULOCYTES # BLD AUTO: 0.01 10*3/MM3 (ref 0–0.05)
IMM GRANULOCYTES NFR BLD AUTO: 0.2 % (ref 0–0.5)
LYMPHOCYTES # BLD AUTO: 1.83 10*3/MM3 (ref 0.7–3.1)
LYMPHOCYTES NFR BLD AUTO: 37.1 % (ref 19.6–45.3)
MCH RBC QN AUTO: 29.5 PG (ref 26.6–33)
MCHC RBC AUTO-ENTMCNC: 33.4 G/DL (ref 31.5–35.7)
MCV RBC AUTO: 88.1 FL (ref 79–97)
MONOCYTES # BLD AUTO: 0.36 10*3/MM3 (ref 0.1–0.9)
MONOCYTES NFR BLD AUTO: 7.3 % (ref 5–12)
NEUTROPHILS NFR BLD AUTO: 2.67 10*3/MM3 (ref 1.7–7)
NEUTROPHILS NFR BLD AUTO: 54.2 % (ref 42.7–76)
NRBC BLD AUTO-RTO: 0 /100 WBC (ref 0–0.2)
PLATELET # BLD AUTO: 227 10*3/MM3 (ref 140–450)
PMV BLD AUTO: 10.6 FL (ref 6–12)
POTASSIUM SERPL-SCNC: 3.5 MMOL/L (ref 3.5–5.2)
PROT SERPL-MCNC: 7.2 G/DL (ref 6–8.5)
QT INTERVAL: 419 MS
RBC # BLD AUTO: 4.55 10*6/MM3 (ref 3.77–5.28)
SODIUM SERPL-SCNC: 138 MMOL/L (ref 136–145)
WBC NRBC COR # BLD: 4.93 10*3/MM3 (ref 3.4–10.8)

## 2023-05-27 PROCEDURE — 80053 COMPREHEN METABOLIC PANEL: CPT | Performed by: EMERGENCY MEDICINE

## 2023-05-27 PROCEDURE — 85025 COMPLETE CBC W/AUTO DIFF WBC: CPT | Performed by: EMERGENCY MEDICINE

## 2023-05-27 PROCEDURE — 93005 ELECTROCARDIOGRAM TRACING: CPT | Performed by: EMERGENCY MEDICINE

## 2023-05-27 PROCEDURE — 99284 EMERGENCY DEPT VISIT MOD MDM: CPT

## 2023-05-27 RX ORDER — METOPROLOL SUCCINATE 25 MG/1
50 TABLET, EXTENDED RELEASE ORAL EVERY 12 HOURS
Qty: 120 TABLET | Refills: 0 | Status: SHIPPED | OUTPATIENT
Start: 2023-05-27

## 2023-05-27 RX ORDER — METOPROLOL TARTRATE 50 MG/1
50 TABLET, FILM COATED ORAL ONCE
Status: COMPLETED | OUTPATIENT
Start: 2023-05-27 | End: 2023-05-27

## 2023-05-27 RX ORDER — LISINOPRIL 10 MG/1
20 TABLET ORAL ONCE
Status: COMPLETED | OUTPATIENT
Start: 2023-05-27 | End: 2023-05-27

## 2023-05-27 RX ORDER — SODIUM CHLORIDE 0.9 % (FLUSH) 0.9 %
10 SYRINGE (ML) INJECTION AS NEEDED
Status: DISCONTINUED | OUTPATIENT
Start: 2023-05-27 | End: 2023-05-27 | Stop reason: HOSPADM

## 2023-05-27 RX ORDER — HYDROCHLOROTHIAZIDE 25 MG/1
25 TABLET ORAL DAILY
Qty: 30 TABLET | Refills: 0 | Status: SHIPPED | OUTPATIENT
Start: 2023-05-27

## 2023-05-27 RX ADMIN — LISINOPRIL 20 MG: 10 TABLET ORAL at 15:43

## 2023-05-27 RX ADMIN — METOPROLOL TARTRATE 50 MG: 50 TABLET, FILM COATED ORAL at 15:43

## 2023-05-27 RX ADMIN — SODIUM CHLORIDE 1000 ML: 9 INJECTION, SOLUTION INTRAVENOUS at 15:55

## 2023-05-27 NOTE — ED PROVIDER NOTES
"Subjective     History provided by:  Patient    History of Present Illness    · Chief complaint: \"Feel funny\"    · Location: Generalized    · Quality/Severity: Patient states she reports feeling lightheaded intermittently.  She states she is tingling in her hands and feet.  She states \"I just do not feel right\".    · Timing/Onset: Started 5 days ago after she got over a viral gastroenteritis with nausea, vomiting and diarrhea.    · Modifying Factors: Activity seems to exacerbate the lightheadedness.  The patient has been noncompliant with her hydrochlorothiazide and potassium chloride due to running out due to missing doctors appointments.    · Associated symptoms: No chest pain.  No syncope.  No vertigo.  No focal motor or sensory deficits.  No change in vision or speech.    · Narrative: The patient is a 43-year-old white female that states she \"feels funny and \"for the last 5 days.  She states she feels intermittently slightly lightheaded when she is active.  She reports tingling in both hands and both feet.  The patient states she missed doctors appointments due to her sister's death and has ran out of her hydrochlorothiazide and potassium chloride.  She does report compliance with her Toprol XL 25 mg.  She does not take her blood pressure at home often.  She does not smoke or drink alcohol.  She works as a .    Review of Systems  Past Medical History:   Diagnosis Date   • Anxiety    • Benign intracranial hypertension    • Blurred vision    • Depression    • Hydrocephalus    • Hypertension    • Pseudotumor cerebri    • Spinal headache     AFTER SPINAL TAP.     /100   Pulse 60   Temp 98.3 °F (36.8 °C) (Oral)   Resp 16   Ht 160 cm (63\")   Wt 88.5 kg (195 lb)   LMP  (LMP Unknown)   SpO2 96%   BMI 34.54 kg/m²     Past Medical History:   Diagnosis Date   • Anxiety    • Benign intracranial hypertension    • Blurred vision    • Depression    • Hydrocephalus    • Hypertension    • " Pseudotumor cerebri    • Spinal headache     AFTER SPINAL TAP.       No Known Allergies    Past Surgical History:   Procedure Laterality Date   • EMBOLIZATION CEREBRAL Left 3/14/2016    Procedure: CEREBRAL ANGIOGRAM W/ LT LUMBAR PUNCTURE;  Surgeon: Maico Groves MD;  Location: [facility] OR 18/19;  Service:    • EMBOLIZATION CEREBRAL Right 5/25/2016    Procedure: CEREBRAL ANGIOGRAM AND VENOGRAM ;  Surgeon: Maico Groves MD;  Location: [facility] OR 18/19;  Service:    • EMBOLIZATION CEREBRAL N/A 7/10/2019    Procedure: Cerebral arteriography and venography with placement of a transverse sinus stent and angioplasty.;  Surgeon: Maico Groves MD;  Location: [facility] OR 18/19;  Service: Neurosurgery   • LUMBAR PUNCTURE  03/14/2016    Dr. Groves   • TUBAL ABDOMINAL LIGATION         Family History   Problem Relation Age of Onset   • Cancer Maternal Grandmother         breast/passed   • Heart disease Maternal Grandfather    • Heart disease Paternal Grandfather    • Hypertension Mother    • No Known Problems Father    • Malig Hyperthermia Neg Hx        Social History     Socioeconomic History   • Marital status: Single   Tobacco Use   • Smoking status: Never   • Smokeless tobacco: Never   Substance and Sexual Activity   • Alcohol use: No   • Drug use: No   • Sexual activity: Defer           Objective   Physical Exam  Vitals and nursing note reviewed.   Constitutional:       General: She is not in acute distress.     Appearance: Normal appearance. She is well-developed. She is not ill-appearing, toxic-appearing or diaphoretic.      Comments: The patient appears healthy in no acute distress.  Review of her vital signs: Her blood pressure was elevated 154/123, remainder vital signs within normal limits.   HENT:      Head: Normocephalic and atraumatic.      Nose: Nose normal.      Mouth/Throat:      Mouth: Mucous membranes are moist.      Pharynx: Oropharynx is clear.   Eyes:      General: No scleral  icterus.        Right eye: No discharge.         Left eye: No discharge.      Conjunctiva/sclera: Conjunctivae normal.      Pupils: Pupils are equal, round, and reactive to light.   Neck:      Thyroid: No thyromegaly.      Vascular: No carotid bruit or JVD.   Cardiovascular:      Rate and Rhythm: Normal rate and regular rhythm.      Heart sounds: Normal heart sounds. No murmur heard.  Pulmonary:      Effort: Pulmonary effort is normal.      Breath sounds: Normal breath sounds. No wheezing, rhonchi or rales.   Chest:      Chest wall: No tenderness.   Abdominal:      General: Bowel sounds are normal. There is no distension.      Palpations: Abdomen is soft.      Tenderness: There is no abdominal tenderness.   Musculoskeletal:         General: No tenderness or deformity. Normal range of motion.      Cervical back: Normal range of motion and neck supple. No tenderness.   Lymphadenopathy:      Cervical: No cervical adenopathy.   Skin:     General: Skin is warm and dry.      Coloration: Skin is not pale.      Findings: No erythema or rash.   Neurological:      General: No focal deficit present.      Mental Status: She is alert and oriented to person, place, and time.      Cranial Nerves: No cranial nerve deficit.      Coordination: Coordination normal.      Comments: No focal motor sensory deficit   Psychiatric:         Mood and Affect: Mood normal.         Behavior: Behavior normal.         Thought Content: Thought content normal.         Judgment: Judgment normal.         Procedures           ED Course  ED Course as of 05/27/23 1840   Sat May 27, 2023   1637 My interpretation of the patient's EKG tracing 416: 26 is normal sinus rhythm with a rate of 60, 90 degree axis, no acute ST segment elevation or depression consistent with ischemia, inverted T waves in leads III and aVF, normal R wave transition, normal NH and QT intervals.  No change in comparison to tracing 7/3/2019.  Normal EKG. [TP]   2079 Review the patient's  laboratory studies: The patient's CMP and CBC were within normal limits. [TP]   1734 The patient was administered a liter normal saline IV bolus.  For her blood pressure she was administered lisinopril 20 mg and metoprolol 50 mg p.o.  At 17: 00 blood pressure dropped to 157/100. [TP]   1735 I am going to increase the patient's metoprolol to 50 mg twice daily and represcribe hydrochlorothiazide 25 mg.  She is instructed to make an appointment to follow-up with her PCP Dr. Osorio early next week. [TP]      ED Course User Index  [TP] Tirso Landon MD                                           Medical Decision Making  My differential diagnosis for dizziness included but is not limited to:  Labyrinthitis, vertigo, concussion, intracranial hemorrhage, stroke, migraine headache, cardiac arrhythmias, acute MI, hypotension, hypertension, hypoxia, inner ear and middle ear infections, anemia, electrolyte abnormalities, dehydration, hyperventilation and anxiety attacks..    Accelerated hypertension: acute illness or injury  Dizziness: acute illness or injury  History of noncompliance with medical treatment: acute illness or injury  Amount and/or Complexity of Data Reviewed  Labs: ordered. Decision-making details documented in ED Course.  ECG/medicine tests: ordered. Decision-making details documented in ED Course.      Risk  Prescription drug management.          Final diagnoses:   Accelerated hypertension   Dizziness   History of noncompliance with medical treatment       ED Disposition  ED Disposition     ED Disposition   Discharge    Condition   Stable    Comment   --             Corey Montiel MD  [address] [place] [address]  [place] [address] [phone]    Schedule an appointment as soon as possible for a visit in 3 days  next available         Medication List      Changed    metoprolol succinate XL 25 MG 24 hr tablet  Commonly known as: TOPROL-XL  Take 2 tablets by mouth Every 12 (Twelve) Hours.  What changed:   · how  much to take  · when to take this           Where to Get Your Medications      These medications were sent to [facility]/pharmacy #[id number] - [place] - [address] [facility] - [phone] [facility] [phone] [place] [id number]    Phone: [phone]   · hydroCHLOROthiazide 25 MG tablet  · metoprolol succinate XL 25 MG 24 hr tablet         Labs Reviewed   COMPREHENSIVE METABOLIC PANEL - Abnormal; Notable for the following components:       Result Value    CO2 32.1 (*)     All other components within normal limits    Narrative:     GFR Normal >60  Chronic Kidney Disease <60  Kidney Failure <15     CBC WITH AUTO DIFFERENTIAL - Abnormal; Notable for the following components:    RDW 11.9 (*)     All other components within normal limits   URINALYSIS W/ MICROSCOPIC IF INDICATED (NO CULTURE)   CBC AND DIFFERENTIAL    Narrative:     The following orders were created for panel order CBC & Differential.  Procedure                               Abnormality         Status                     ---------                               -----------         ------                     CBC Auto Differential[[id number]]        Abnormal            Final result                 Please view results for these tests on the individual orders.     No orders to display          Medication List      Changed    metoprolol succinate XL 25 MG 24 hr tablet  Commonly known as: TOPROL-XL  Take 2 tablets by mouth Every 12 (Twelve) Hours.  What changed:   · how much to take  · when to take this           Where to Get Your Medications      These medications were sent to [facility]/pharmacy #[id number] - [place], [place] - [address] [facility] - [phone] [facility] [phone] [place] [address]    Phone: [phone]   · hydroCHLOROthiazide 25 MG tablet  · metoprolol succinate XL 25 MG 24 hr tablet              Tirso Landon MD  05/27/23 0156

## 2023-12-11 ENCOUNTER — HOSPITAL ENCOUNTER (EMERGENCY)
Facility: HOSPITAL | Age: 43
Discharge: HOME | End: 2023-12-11
Attending: EMERGENCY MEDICINE | Admitting: EMERGENCY MEDICINE

## 2023-12-11 ENCOUNTER — APPOINTMENT (OUTPATIENT)
Dept: GENERAL RADIOLOGY | Facility: HOSPITAL | Age: 43
End: 2023-12-11

## 2023-12-11 VITALS
HEART RATE: 97 BPM | RESPIRATION RATE: 17 BRPM | BODY MASS INDEX: 36.8 KG/M2 | TEMPERATURE: 98.3 F | OXYGEN SATURATION: 95 % | DIASTOLIC BLOOD PRESSURE: 101 MMHG | HEIGHT: 62 IN | SYSTOLIC BLOOD PRESSURE: 156 MMHG | WEIGHT: 200 LBS

## 2023-12-11 DIAGNOSIS — J10.1 INFLUENZA B: Primary | ICD-10-CM

## 2023-12-11 LAB
FLUAV RNA RESP QL NAA+PROBE: NOT DETECTED
FLUBV RNA RESP QL NAA+PROBE: DETECTED
SARS-COV-2 RNA RESP QL NAA+PROBE: NOT DETECTED

## 2023-12-11 PROCEDURE — 93005 ELECTROCARDIOGRAM TRACING: CPT

## 2023-12-11 PROCEDURE — 71045 X-RAY EXAM CHEST 1 VIEW: CPT

## 2023-12-11 PROCEDURE — 87636 SARSCOV2 & INF A&B AMP PRB: CPT | Performed by: EMERGENCY MEDICINE

## 2023-12-11 PROCEDURE — 99284 EMERGENCY DEPT VISIT MOD MDM: CPT

## 2023-12-11 PROCEDURE — 93005 ELECTROCARDIOGRAM TRACING: CPT | Performed by: EMERGENCY MEDICINE

## 2023-12-11 RX ORDER — METHYLPREDNISOLONE 4 MG/1
TABLET ORAL
Qty: 21 TABLET | Refills: 0 | Status: SHIPPED | OUTPATIENT
Start: 2023-12-11

## 2023-12-11 RX ORDER — BENZONATATE 100 MG/1
100 CAPSULE ORAL 3 TIMES DAILY PRN
Qty: 21 CAPSULE | Refills: 0 | Status: SHIPPED | OUTPATIENT
Start: 2023-12-11 | End: 2023-12-18

## 2023-12-12 LAB
QT INTERVAL: 354 MS
QTC INTERVAL: 450 MS

## 2023-12-12 NOTE — ED PROVIDER NOTES
Subjective   History of Present Illness  43-year-old female presents emergency room complaining of several days worth of cough congestion and flulike symptoms.  Patient states she is taken no medications for such nor she seen a primary care physician for such.  Patient denies visual changes neck pain jaw pain sore throat diaphoresis chest pain anorexia nausea vomiting or diarrhea.      Review of Systems   Constitutional:  Positive for activity change, fatigue and fever. Negative for appetite change, chills and diaphoresis.   HENT:  Negative for congestion, rhinorrhea and sore throat.    Eyes:  Negative for photophobia and visual disturbance.   Respiratory:  Positive for cough and shortness of breath.    Cardiovascular:  Negative for chest pain, palpitations and leg swelling.   Gastrointestinal:  Negative for abdominal distention, abdominal pain, diarrhea, nausea and vomiting.   Genitourinary:  Negative for dysuria and flank pain.   Musculoskeletal:  Negative for arthralgias and back pain.   Skin:  Negative for rash.   Neurological:  Negative for dizziness, weakness and headaches.   Psychiatric/Behavioral:  Negative for agitation and behavioral problems.        Past Medical History:   Diagnosis Date    Anxiety     Benign intracranial hypertension     Blurred vision     Depression     Hydrocephalus     Hypertension     Pseudotumor cerebri     Spinal headache     AFTER SPINAL TAP.       No Known Allergies    Past Surgical History:   Procedure Laterality Date    EMBOLIZATION CEREBRAL Left 3/14/2016    Procedure: CEREBRAL ANGIOGRAM W/ LT LUMBAR PUNCTURE;  Surgeon: Maico Groves MD;  Location: [facility] OR 18/19;  Service:     EMBOLIZATION CEREBRAL Right 5/25/2016    Procedure: CEREBRAL ANGIOGRAM AND VENOGRAM ;  Surgeon: Maico Groves MD;  Location: [place] [address]/;  Service:     EMBOLIZATION CEREBRAL N/A 7/10/2019    Procedure: Cerebral arteriography and venography with placement of a transverse sinus  stent and angioplasty.;  Surgeon: Maico Groves MD;  Location: [facility] OR 18/19;  Service: Neurosurgery    LUMBAR PUNCTURE  03/14/2016    Dr. Groves    TUBAL ABDOMINAL LIGATION         Family History   Problem Relation Age of Onset    Cancer Maternal Grandmother         breast/passed    Heart disease Maternal Grandfather     Heart disease Paternal Grandfather     Hypertension Mother     No Known Problems Father     Malig Hyperthermia Neg Hx        Social History     Socioeconomic History    Marital status: Single   Tobacco Use    Smoking status: Never    Smokeless tobacco: Never   Substance and Sexual Activity    Alcohol use: No    Drug use: No    Sexual activity: Defer           Objective   Physical Exam  Vitals and nursing note reviewed.   Constitutional:       General: She is not in acute distress.     Appearance: Normal appearance. She is not ill-appearing, toxic-appearing or diaphoretic.      Comments: Pleasant 43-year-old female in no apparent distress sitting up in bed talking in full sentences without difficulty.   HENT:      Head: Normocephalic and atraumatic.      Nose: Nose normal.      Mouth/Throat:      Mouth: Mucous membranes are moist.   Eyes:      Conjunctiva/sclera: Conjunctivae normal.   Cardiovascular:      Rate and Rhythm: Normal rate and regular rhythm.      Pulses: Normal pulses.   Pulmonary:      Effort: Pulmonary effort is normal.      Breath sounds: Normal breath sounds.   Abdominal:      General: Abdomen is flat. There is no distension.   Musculoskeletal:         General: No swelling. Normal range of motion.      Cervical back: Normal range of motion and neck supple.      Right lower leg: No edema.      Left lower leg: No edema.   Skin:     General: Skin is warm and dry.   Neurological:      General: No focal deficit present.      Mental Status: She is alert and oriented to person, place, and time.   Psychiatric:         Mood and Affect: Mood normal.         Procedures            ED Course  ED Course as of 12/11/23 2132   Mon Dec 11, 2023   2109 EKG time 2051  Normal sinus rhythm  Heart rate 97  Intervals normal  No acute ST normalities noted []   2128 Influenza B PCR(!): Detected []   2128 Influenza A PCR: Not Detected []   2128 COVID19: Not Detected []   2128 CXR:  IMPRESSION:  Impression: No acute cardiopulmonary pathology.     Signer Name: Wm Arango MD   Signed: 12/11/2023 9:25 PM EST  [facility]   []   2128 Discussed findings with patient specifically influenza B positive.  Patient will be treated symptomatically and can follow-up with primary care as needed and/or can return emergency room for new persistent or worsening symptoms. []      ED Course User Index  [] Reji Saxena MD                                             Medical Decision Making  My differential diagnosis for cough includes but is not limited to:  Upper respiratory infection, bronchitis, pneumonia, COPD exacerbation, cough variant asthma, cardiac asthma, coronary artery disease, congestive heart failure, bacterial, viral or lung infections, lung cancer, aspiration pneumonitis, aspiration of foreign body and Covid -19           Amount and/or Complexity of Data Reviewed  Radiology: ordered. Decision-making details documented in ED Course.  ECG/medicine tests: ordered. Decision-making details documented in ED Course.        Final diagnoses:   Influenza B       ED Disposition  ED Disposition       ED Disposition   Discharge    Condition   Stable    Comment   --               Corey Montiel MD  [address] [place] [address]  [place] [address] [phone]    Schedule an appointment as soon as possible for a visit       [facility] EMERGENCY DEPARTMENT  [facility] [facility] [address] [phone]  Go to   As needed         Medication List        New Prescriptions      benzonatate 100 MG capsule  Commonly known as: TESSALON  Take 1 capsule by  mouth 3 (Three) Times a Day As Needed for Cough for up to 7 days.     methylPREDNISolone 4 MG dose pack  Commonly known as: MEDROL  Take as directed on package instructions.               Where to Get Your Medications        These medications were sent to [facility]/pharmacy #[id number] - [place], [place] - [address] [facility] - [phone]  - [phone]   [address] [facility] [address]      Phone: [phone]   benzonatate 100 MG capsule  methylPREDNISolone 4 MG dose pack            Reji Saxena MD  12/11/23 0965

## (undated) DEVICE — SYR LL 3CC

## (undated) DEVICE — Device

## (undated) DEVICE — STANDARD GUIDEWIRE WITH HYDROPHILIC COATING: Brand: SYNCHRO 2

## (undated) DEVICE — PK ANGIO CERBRL RAD 40

## (undated) DEVICE — MYNXGRIP 6F/7F: Brand: MYNXGRIP

## (undated) DEVICE — GLV SURG PREMIERPRO ORTHO LTX PF SZ8.5 BRN

## (undated) DEVICE — COPILOT BLEEDBACK CONTROL VALVE: Brand: COPILOT

## (undated) DEVICE — BASN GW RINGMASTER

## (undated) DEVICE — RADIFOCUS TORQUE DEVICE MULTI-TORQUE VISE: Brand: RADIFOCUS TORQUE DEVICE

## (undated) DEVICE — TBG ART PRESS 60 IN

## (undated) DEVICE — KT NEURO CUST

## (undated) DEVICE — SHLD ANGIO 2LAYR CIR FEN

## (undated) DEVICE — STPCK 3WY HP ROT

## (undated) DEVICE — GLV SURG SENSICARE PI LF PF 7.5 GRN STRL

## (undated) DEVICE — GLV SURG BIOGEL LTX PF 7 1/2

## (undated) DEVICE — TOWEL,OR,DSP,ST,BLUE,STD,4/PK,20PK/CS: Brand: MEDLINE

## (undated) DEVICE — SHEATH FLXOR SHUTTLESELECT .038 6F2.2 90

## (undated) DEVICE — CATH ANGIO TRCN NB BCN .038 5F 100CM DAV

## (undated) DEVICE — RADIFOCUS GLIDEWIRE: Brand: GLIDEWIRE

## (undated) DEVICE — GLV SURG SENSICARE MICRO PF LF 8 STRL

## (undated) DEVICE — SPNG GZ STRL 2S 4X4 12PLY

## (undated) DEVICE — TOTAL TRAY, 16FR 10ML SIL FOLEY, URN: Brand: MEDLINE

## (undated) DEVICE — 1 TIP PRE-SHAPED MICROCATHETER: Brand: EXCELSIOR XT-27 FLEX

## (undated) DEVICE — SOL NACL 0.9PCT 1000ML

## (undated) DEVICE — EXTENSION SET, MALE LUER LOCK ADAPTER WITH RETRACTABLE COLLAR

## (undated) DEVICE — PRESSURE MONITORING SET: Brand: TRUWAVE

## (undated) DEVICE — 0.2 MICRON INTRAVENOUS FILTER FOR 96 HOUR USE WITH MICRO-BORE EXTENSION TUBING AN LUER-LOCK ADAPTER: Brand: PALL POSIDYNE® ELD FILTER

## (undated) DEVICE — DESTINATION CAROTID GUIDING SHEATH: Brand: DESTINATION

## (undated) DEVICE — Device: Brand: D-STAT® DRY SILVER CLEAR TOPICAL HEMOSTAT

## (undated) DEVICE — DRSNG SURESITE WNDW 4X4.5

## (undated) DEVICE — ST IV PRI VENOSET NV W/CLAMP 72IN

## (undated) DEVICE — MYNXGRIP 5F: Brand: MYNXGRIP

## (undated) DEVICE — GW AMPLTZ SUPERSTIFF STR .035IN 260CM

## (undated) DEVICE — PINNACLE R/O II INTRODUCER SHEATH WITH RADIOPAQUE MARKER: Brand: PINNACLE

## (undated) DEVICE — APPL CHLORAPREP W/TINT 26ML ORNG

## (undated) DEVICE — PINNACLE INTRODUCER SHEATH: Brand: PINNACLE

## (undated) DEVICE — DEV ANGIO FLOSWITCH HP BX24

## (undated) DEVICE — GW THRUWAY TPR STR SHRT .014 300CM

## (undated) DEVICE — GOWN,PREVENTION PLUS,XXLARGE,STERILE: Brand: MEDLINE